# Patient Record
Sex: FEMALE | Race: BLACK OR AFRICAN AMERICAN | Employment: UNEMPLOYED | URBAN - METROPOLITAN AREA
[De-identification: names, ages, dates, MRNs, and addresses within clinical notes are randomized per-mention and may not be internally consistent; named-entity substitution may affect disease eponyms.]

---

## 2024-07-10 ENCOUNTER — APPOINTMENT (OUTPATIENT)
Dept: CT IMAGING | Age: 39
DRG: 074 | End: 2024-07-10
Payer: MEDICAID

## 2024-07-10 ENCOUNTER — HOSPITAL ENCOUNTER (INPATIENT)
Age: 39
LOS: 4 days | Discharge: HOME | DRG: 074 | End: 2024-07-14
Attending: EMERGENCY MEDICINE | Admitting: FAMILY MEDICINE
Payer: MEDICAID

## 2024-07-10 DIAGNOSIS — K31.84 DIABETIC GASTROPARESIS (HCC): Primary | ICD-10-CM

## 2024-07-10 DIAGNOSIS — R11.2 NAUSEA AND VOMITING, UNSPECIFIED VOMITING TYPE: ICD-10-CM

## 2024-07-10 DIAGNOSIS — R10.84 GENERALIZED ABDOMINAL PAIN: ICD-10-CM

## 2024-07-10 DIAGNOSIS — E11.43 DIABETIC GASTROPARESIS (HCC): Primary | ICD-10-CM

## 2024-07-10 PROBLEM — F12.10 MARIJUANA ABUSE: Status: ACTIVE | Noted: 2024-07-10

## 2024-07-10 PROBLEM — I10 HYPERTENSION: Status: ACTIVE | Noted: 2024-07-10

## 2024-07-10 PROBLEM — K59.00 CONSTIPATION: Status: ACTIVE | Noted: 2024-07-10

## 2024-07-10 LAB
ALBUMIN SERPL-MCNC: 3.9 G/DL (ref 3.5–5.2)
ALP SERPL-CCNC: 123 U/L (ref 35–104)
ALT SERPL-CCNC: 15 U/L (ref 5–33)
AMPHET UR QL SCN: NEGATIVE
AMYLASE SERPL-CCNC: 79 U/L (ref 28–100)
ANION GAP SERPL CALCULATED.3IONS-SCNC: 17 MMOL/L (ref 9–17)
AST SERPL-CCNC: 23 U/L
B-OH-BUTYR SERPL-MCNC: 0.32 MMOL/L (ref 0.02–0.27)
BACTERIA URNS QL MICRO: ABNORMAL
BARBITURATES UR QL SCN: NEGATIVE
BASOPHILS # BLD: 0 K/UL (ref 0–0.2)
BASOPHILS NFR BLD: 0 % (ref 0–2)
BENZODIAZ UR QL: NEGATIVE
BILIRUB DIRECT SERPL-MCNC: 0.1 MG/DL
BILIRUB INDIRECT SERPL-MCNC: 0.2 MG/DL (ref 0–1)
BILIRUB SERPL-MCNC: 0.3 MG/DL (ref 0.3–1.2)
BILIRUB UR QL STRIP: NEGATIVE
BUN SERPL-MCNC: 9 MG/DL (ref 6–20)
BUN/CREAT SERPL: 13 (ref 9–20)
CALCIUM SERPL-MCNC: 9 MG/DL (ref 8.6–10.4)
CANNABINOIDS UR QL SCN: POSITIVE
CHLORIDE SERPL-SCNC: 103 MMOL/L (ref 98–107)
CLARITY UR: ABNORMAL
CO2 SERPL-SCNC: 18 MMOL/L (ref 20–31)
COCAINE UR QL SCN: NEGATIVE
COLOR UR: YELLOW
CREAT SERPL-MCNC: 0.7 MG/DL (ref 0.5–0.9)
EOSINOPHIL # BLD: 0 K/UL (ref 0–0.44)
EOSINOPHILS RELATIVE PERCENT: 0 % (ref 1–4)
EPI CELLS #/AREA URNS HPF: ABNORMAL /HPF (ref 0–5)
ERYTHROCYTE [DISTWIDTH] IN BLOOD BY AUTOMATED COUNT: 21.6 % (ref 11.8–14.4)
FENTANYL UR QL: NEGATIVE
GFR, ESTIMATED: >90 ML/MIN/1.73M2
GLUCOSE BLD-MCNC: 298 MG/DL (ref 65–105)
GLUCOSE BLD-MCNC: 328 MG/DL (ref 65–105)
GLUCOSE SERPL-MCNC: 320 MG/DL (ref 70–99)
GLUCOSE UR STRIP-MCNC: ABNORMAL MG/DL
HCG SERPL QL: NEGATIVE
HCO3 VENOUS: 23.1 MMOL/L (ref 22–29)
HCT VFR BLD AUTO: 31.8 % (ref 36.3–47.1)
HGB BLD-MCNC: 8.5 G/DL (ref 11.9–15.1)
HGB UR QL STRIP.AUTO: ABNORMAL
IMM GRANULOCYTES # BLD AUTO: 0 K/UL (ref 0–0.3)
IMM GRANULOCYTES NFR BLD: 0 %
KETONES UR STRIP-MCNC: ABNORMAL MG/DL
LACTATE BLDV-SCNC: 3.2 MMOL/L (ref 0.5–2.2)
LEUKOCYTE ESTERASE UR QL STRIP: NEGATIVE
LIPASE SERPL-CCNC: 13 U/L (ref 13–60)
LYMPHOCYTES NFR BLD: 2.57 K/UL (ref 1.1–3.7)
LYMPHOCYTES RELATIVE PERCENT: 19 % (ref 24–43)
MAGNESIUM SERPL-MCNC: 2 MG/DL (ref 1.6–2.6)
MCH RBC QN AUTO: 16.2 PG (ref 25.2–33.5)
MCHC RBC AUTO-ENTMCNC: 26.7 G/DL (ref 28.4–34.8)
MCV RBC AUTO: 60.7 FL (ref 82.6–102.9)
METHADONE UR QL: NEGATIVE
MONOCYTES NFR BLD: 0.54 K/UL (ref 0.1–1.2)
MONOCYTES NFR BLD: 4 % (ref 3–12)
MORPHOLOGY: ABNORMAL
NEGATIVE BASE EXCESS, VEN: 1.7 MMOL/L (ref 0–2)
NEUTROPHILS NFR BLD: 77 % (ref 36–65)
NEUTS SEG NFR BLD: 10.39 K/UL (ref 1.5–8.1)
NITRITE UR QL STRIP: NEGATIVE
NRBC BLD-RTO: 0 PER 100 WBC
O2 SAT, VEN: 99.5 % (ref 60–85)
OPIATES UR QL SCN: POSITIVE
OXYCODONE UR QL SCN: NEGATIVE
PCO2 VENOUS: 38.3 MM HG (ref 41–51)
PCP UR QL SCN: NEGATIVE
PH UR STRIP: 7 [PH] (ref 5–8)
PH VENOUS: 7.39 (ref 7.32–7.43)
PLATELET # BLD AUTO: 333 K/UL (ref 138–453)
PMV BLD AUTO: 9.6 FL (ref 8.1–13.5)
PO2 VENOUS: 164 MM HG (ref 30–50)
POTASSIUM SERPL-SCNC: 3.9 MMOL/L (ref 3.7–5.3)
PROT SERPL-MCNC: 7.8 G/DL (ref 6.4–8.3)
PROT UR STRIP-MCNC: NEGATIVE MG/DL
RBC # BLD AUTO: 5.24 M/UL (ref 3.95–5.11)
RBC #/AREA URNS HPF: ABNORMAL /HPF (ref 0–2)
SODIUM SERPL-SCNC: 138 MMOL/L (ref 135–144)
SP GR UR STRIP: 1.01 (ref 1–1.03)
TEST INFORMATION: ABNORMAL
UROBILINOGEN UR STRIP-ACNC: NORMAL EU/DL (ref 0–1)
WBC #/AREA URNS HPF: ABNORMAL /HPF (ref 0–5)
WBC OTHER # BLD: 13.5 K/UL (ref 3.5–11.3)

## 2024-07-10 PROCEDURE — 83735 ASSAY OF MAGNESIUM: CPT

## 2024-07-10 PROCEDURE — 6360000002 HC RX W HCPCS

## 2024-07-10 PROCEDURE — 84703 CHORIONIC GONADOTROPIN ASSAY: CPT

## 2024-07-10 PROCEDURE — 85025 COMPLETE CBC W/AUTO DIFF WBC: CPT

## 2024-07-10 PROCEDURE — 99222 1ST HOSP IP/OBS MODERATE 55: CPT

## 2024-07-10 PROCEDURE — 2580000003 HC RX 258: Performed by: NURSE PRACTITIONER

## 2024-07-10 PROCEDURE — 6360000004 HC RX CONTRAST MEDICATION: Performed by: NURSE PRACTITIONER

## 2024-07-10 PROCEDURE — 6370000000 HC RX 637 (ALT 250 FOR IP)

## 2024-07-10 PROCEDURE — 82947 ASSAY GLUCOSE BLOOD QUANT: CPT

## 2024-07-10 PROCEDURE — 96375 TX/PRO/DX INJ NEW DRUG ADDON: CPT

## 2024-07-10 PROCEDURE — 82010 KETONE BODYS QUAN: CPT

## 2024-07-10 PROCEDURE — 80307 DRUG TEST PRSMV CHEM ANLYZR: CPT

## 2024-07-10 PROCEDURE — 2580000003 HC RX 258: Performed by: EMERGENCY MEDICINE

## 2024-07-10 PROCEDURE — 83605 ASSAY OF LACTIC ACID: CPT

## 2024-07-10 PROCEDURE — 82150 ASSAY OF AMYLASE: CPT

## 2024-07-10 PROCEDURE — 6360000002 HC RX W HCPCS: Performed by: NURSE PRACTITIONER

## 2024-07-10 PROCEDURE — 99285 EMERGENCY DEPT VISIT HI MDM: CPT

## 2024-07-10 PROCEDURE — 80076 HEPATIC FUNCTION PANEL: CPT

## 2024-07-10 PROCEDURE — 2060000000 HC ICU INTERMEDIATE R&B

## 2024-07-10 PROCEDURE — 82803 BLOOD GASES ANY COMBINATION: CPT

## 2024-07-10 PROCEDURE — 83690 ASSAY OF LIPASE: CPT

## 2024-07-10 PROCEDURE — 80048 BASIC METABOLIC PNL TOTAL CA: CPT

## 2024-07-10 PROCEDURE — 6360000002 HC RX W HCPCS: Performed by: EMERGENCY MEDICINE

## 2024-07-10 PROCEDURE — 81001 URINALYSIS AUTO W/SCOPE: CPT

## 2024-07-10 PROCEDURE — 96374 THER/PROPH/DIAG INJ IV PUSH: CPT

## 2024-07-10 PROCEDURE — 74177 CT ABD & PELVIS W/CONTRAST: CPT

## 2024-07-10 RX ORDER — MORPHINE SULFATE 2 MG/ML
2 INJECTION, SOLUTION INTRAMUSCULAR; INTRAVENOUS EVERY 4 HOURS PRN
Status: DISCONTINUED | OUTPATIENT
Start: 2024-07-10 | End: 2024-07-11

## 2024-07-10 RX ORDER — INSULIN LISPRO 100 [IU]/ML
0-4 INJECTION, SOLUTION INTRAVENOUS; SUBCUTANEOUS
Status: DISCONTINUED | OUTPATIENT
Start: 2024-07-11 | End: 2024-07-14 | Stop reason: HOSPADM

## 2024-07-10 RX ORDER — INSULIN LISPRO 100 [IU]/ML
3 INJECTION, SOLUTION INTRAVENOUS; SUBCUTANEOUS ONCE
Status: COMPLETED | OUTPATIENT
Start: 2024-07-11 | End: 2024-07-10

## 2024-07-10 RX ORDER — METOCLOPRAMIDE HYDROCHLORIDE 5 MG/ML
10 INJECTION INTRAMUSCULAR; INTRAVENOUS EVERY 4 HOURS PRN
Status: DISCONTINUED | OUTPATIENT
Start: 2024-07-10 | End: 2024-07-11

## 2024-07-10 RX ORDER — SODIUM CHLORIDE 0.9 % (FLUSH) 0.9 %
10 SYRINGE (ML) INJECTION PRN
Status: DISCONTINUED | OUTPATIENT
Start: 2024-07-10 | End: 2024-07-14 | Stop reason: HOSPADM

## 2024-07-10 RX ORDER — ACETAMINOPHEN 325 MG/1
650 TABLET ORAL EVERY 6 HOURS PRN
Status: DISCONTINUED | OUTPATIENT
Start: 2024-07-10 | End: 2024-07-14 | Stop reason: HOSPADM

## 2024-07-10 RX ORDER — FENTANYL CITRATE 0.05 MG/ML
25 INJECTION, SOLUTION INTRAMUSCULAR; INTRAVENOUS ONCE
Status: COMPLETED | OUTPATIENT
Start: 2024-07-10 | End: 2024-07-10

## 2024-07-10 RX ORDER — INSULIN LISPRO 100 [IU]/ML
0-4 INJECTION, SOLUTION INTRAVENOUS; SUBCUTANEOUS NIGHTLY
Status: DISCONTINUED | OUTPATIENT
Start: 2024-07-10 | End: 2024-07-14 | Stop reason: HOSPADM

## 2024-07-10 RX ORDER — POTASSIUM CHLORIDE 7.45 MG/ML
10 INJECTION INTRAVENOUS PRN
Status: DISCONTINUED | OUTPATIENT
Start: 2024-07-10 | End: 2024-07-14 | Stop reason: HOSPADM

## 2024-07-10 RX ORDER — SODIUM CHLORIDE 0.9 % (FLUSH) 0.9 %
5-40 SYRINGE (ML) INJECTION EVERY 12 HOURS SCHEDULED
Status: DISCONTINUED | OUTPATIENT
Start: 2024-07-10 | End: 2024-07-14 | Stop reason: HOSPADM

## 2024-07-10 RX ORDER — ONDANSETRON 4 MG/1
4 TABLET, ORALLY DISINTEGRATING ORAL EVERY 8 HOURS PRN
Status: DISCONTINUED | OUTPATIENT
Start: 2024-07-10 | End: 2024-07-12

## 2024-07-10 RX ORDER — MAGNESIUM SULFATE 1 G/100ML
1000 INJECTION INTRAVENOUS PRN
Status: DISCONTINUED | OUTPATIENT
Start: 2024-07-10 | End: 2024-07-14 | Stop reason: HOSPADM

## 2024-07-10 RX ORDER — ONDANSETRON 2 MG/ML
4 INJECTION INTRAMUSCULAR; INTRAVENOUS EVERY 6 HOURS PRN
Status: DISCONTINUED | OUTPATIENT
Start: 2024-07-10 | End: 2024-07-12

## 2024-07-10 RX ORDER — 0.9 % SODIUM CHLORIDE 0.9 %
1000 INTRAVENOUS SOLUTION INTRAVENOUS ONCE
Status: COMPLETED | OUTPATIENT
Start: 2024-07-10 | End: 2024-07-10

## 2024-07-10 RX ORDER — HYDRALAZINE HYDROCHLORIDE 20 MG/ML
10 INJECTION INTRAMUSCULAR; INTRAVENOUS EVERY 6 HOURS PRN
Status: DISCONTINUED | OUTPATIENT
Start: 2024-07-10 | End: 2024-07-14 | Stop reason: HOSPADM

## 2024-07-10 RX ORDER — ENOXAPARIN SODIUM 100 MG/ML
40 INJECTION SUBCUTANEOUS DAILY
Status: DISCONTINUED | OUTPATIENT
Start: 2024-07-11 | End: 2024-07-14 | Stop reason: HOSPADM

## 2024-07-10 RX ORDER — POLYETHYLENE GLYCOL 3350 17 G/17G
17 POWDER, FOR SOLUTION ORAL DAILY PRN
Status: DISCONTINUED | OUTPATIENT
Start: 2024-07-10 | End: 2024-07-14 | Stop reason: HOSPADM

## 2024-07-10 RX ORDER — ACETAMINOPHEN 650 MG/1
650 SUPPOSITORY RECTAL EVERY 6 HOURS PRN
Status: DISCONTINUED | OUTPATIENT
Start: 2024-07-10 | End: 2024-07-14 | Stop reason: HOSPADM

## 2024-07-10 RX ORDER — CAPSAICIN 0.75 MG/G
CREAM TOPICAL EVERY 6 HOURS
Status: DISCONTINUED | OUTPATIENT
Start: 2024-07-10 | End: 2024-07-14 | Stop reason: HOSPADM

## 2024-07-10 RX ORDER — HALOPERIDOL 5 MG/ML
2 INJECTION INTRAMUSCULAR ONCE
Status: COMPLETED | OUTPATIENT
Start: 2024-07-10 | End: 2024-07-10

## 2024-07-10 RX ORDER — METOCLOPRAMIDE HYDROCHLORIDE 5 MG/ML
10 INJECTION INTRAMUSCULAR; INTRAVENOUS ONCE
Status: COMPLETED | OUTPATIENT
Start: 2024-07-10 | End: 2024-07-10

## 2024-07-10 RX ORDER — SODIUM CHLORIDE, SODIUM LACTATE, POTASSIUM CHLORIDE, CALCIUM CHLORIDE 600; 310; 30; 20 MG/100ML; MG/100ML; MG/100ML; MG/100ML
INJECTION, SOLUTION INTRAVENOUS CONTINUOUS
Status: DISCONTINUED | OUTPATIENT
Start: 2024-07-10 | End: 2024-07-14 | Stop reason: HOSPADM

## 2024-07-10 RX ORDER — MORPHINE SULFATE 4 MG/ML
4 INJECTION, SOLUTION INTRAMUSCULAR; INTRAVENOUS ONCE
Status: COMPLETED | OUTPATIENT
Start: 2024-07-10 | End: 2024-07-10

## 2024-07-10 RX ORDER — ONDANSETRON 2 MG/ML
4 INJECTION INTRAMUSCULAR; INTRAVENOUS ONCE
Status: DISCONTINUED | OUTPATIENT
Start: 2024-07-10 | End: 2024-07-10

## 2024-07-10 RX ORDER — DEXTROSE MONOHYDRATE 100 MG/ML
INJECTION, SOLUTION INTRAVENOUS CONTINUOUS PRN
Status: DISCONTINUED | OUTPATIENT
Start: 2024-07-10 | End: 2024-07-14 | Stop reason: HOSPADM

## 2024-07-10 RX ORDER — POTASSIUM CHLORIDE 20 MEQ/1
40 TABLET, EXTENDED RELEASE ORAL PRN
Status: DISCONTINUED | OUTPATIENT
Start: 2024-07-10 | End: 2024-07-14 | Stop reason: HOSPADM

## 2024-07-10 RX ORDER — 0.9 % SODIUM CHLORIDE 0.9 %
100 INTRAVENOUS SOLUTION INTRAVENOUS ONCE
Status: DISCONTINUED | OUTPATIENT
Start: 2024-07-10 | End: 2024-07-14 | Stop reason: HOSPADM

## 2024-07-10 RX ORDER — SODIUM CHLORIDE 9 MG/ML
INJECTION, SOLUTION INTRAVENOUS PRN
Status: DISCONTINUED | OUTPATIENT
Start: 2024-07-10 | End: 2024-07-14 | Stop reason: HOSPADM

## 2024-07-10 RX ORDER — ONDANSETRON 2 MG/ML
4 INJECTION INTRAMUSCULAR; INTRAVENOUS ONCE
Status: COMPLETED | OUTPATIENT
Start: 2024-07-10 | End: 2024-07-10

## 2024-07-10 RX ORDER — HALOPERIDOL 0.5 MG/1
0.5 TABLET ORAL EVERY 6 HOURS PRN
Status: DISCONTINUED | OUTPATIENT
Start: 2024-07-10 | End: 2024-07-11

## 2024-07-10 RX ADMIN — IOPAMIDOL 75 ML: 755 INJECTION, SOLUTION INTRAVENOUS at 16:58

## 2024-07-10 RX ADMIN — Medication 100 ML: at 16:58

## 2024-07-10 RX ADMIN — SODIUM CHLORIDE, POTASSIUM CHLORIDE, SODIUM LACTATE AND CALCIUM CHLORIDE: 600; 310; 30; 20 INJECTION, SOLUTION INTRAVENOUS at 19:38

## 2024-07-10 RX ADMIN — HALOPERIDOL LACTATE 2 MG: 5 INJECTION, SOLUTION INTRAMUSCULAR at 19:08

## 2024-07-10 RX ADMIN — FENTANYL CITRATE 25 MCG: 0.05 INJECTION, SOLUTION INTRAMUSCULAR; INTRAVENOUS at 19:07

## 2024-07-10 RX ADMIN — METOCLOPRAMIDE HYDROCHLORIDE 10 MG: 5 INJECTION INTRAMUSCULAR; INTRAVENOUS at 16:24

## 2024-07-10 RX ADMIN — PANTOPRAZOLE SODIUM 40 MG: 40 INJECTION, POWDER, FOR SOLUTION INTRAVENOUS at 16:24

## 2024-07-10 RX ADMIN — ONDANSETRON 4 MG: 2 INJECTION INTRAMUSCULAR; INTRAVENOUS at 23:02

## 2024-07-10 RX ADMIN — CAPSAICIN: 0.75 CREAM TOPICAL at 23:43

## 2024-07-10 RX ADMIN — SODIUM CHLORIDE 1000 ML: 9 INJECTION, SOLUTION INTRAVENOUS at 16:23

## 2024-07-10 RX ADMIN — METOCLOPRAMIDE HYDROCHLORIDE 10 MG: 5 INJECTION INTRAMUSCULAR; INTRAVENOUS at 21:38

## 2024-07-10 RX ADMIN — MORPHINE SULFATE 4 MG: 4 INJECTION, SOLUTION INTRAMUSCULAR; INTRAVENOUS at 16:24

## 2024-07-10 RX ADMIN — SODIUM CHLORIDE, PRESERVATIVE FREE 10 ML: 5 INJECTION INTRAVENOUS at 16:58

## 2024-07-10 RX ADMIN — ONDANSETRON 4 MG: 2 INJECTION INTRAMUSCULAR; INTRAVENOUS at 19:34

## 2024-07-10 RX ADMIN — MORPHINE SULFATE 2 MG: 2 INJECTION, SOLUTION INTRAMUSCULAR; INTRAVENOUS at 23:19

## 2024-07-10 RX ADMIN — INSULIN LISPRO 3 UNITS: 100 INJECTION, SOLUTION INTRAVENOUS; SUBCUTANEOUS at 23:42

## 2024-07-10 ASSESSMENT — PAIN SCALES - GENERAL
PAINLEVEL_OUTOF10: 0
PAINLEVEL_OUTOF10: 9
PAINLEVEL_OUTOF10: 10
PAINLEVEL_OUTOF10: 9
PAINLEVEL_OUTOF10: 8
PAINLEVEL_OUTOF10: 2
PAINLEVEL_OUTOF10: 9

## 2024-07-10 ASSESSMENT — PAIN - FUNCTIONAL ASSESSMENT
PAIN_FUNCTIONAL_ASSESSMENT: PREVENTS OR INTERFERES SOME ACTIVE ACTIVITIES AND ADLS
PAIN_FUNCTIONAL_ASSESSMENT: NONE - DENIES PAIN
PAIN_FUNCTIONAL_ASSESSMENT: PREVENTS OR INTERFERES SOME ACTIVE ACTIVITIES AND ADLS

## 2024-07-10 ASSESSMENT — PAIN DESCRIPTION - LOCATION
LOCATION: ABDOMEN

## 2024-07-10 ASSESSMENT — LIFESTYLE VARIABLES
HOW OFTEN DO YOU HAVE A DRINK CONTAINING ALCOHOL: NEVER
HOW MANY STANDARD DRINKS CONTAINING ALCOHOL DO YOU HAVE ON A TYPICAL DAY: PATIENT DOES NOT DRINK

## 2024-07-10 ASSESSMENT — PAIN DESCRIPTION - DESCRIPTORS
DESCRIPTORS: SHARP
DESCRIPTORS: BURNING
DESCRIPTORS: BURNING;ACHING;TENDER

## 2024-07-10 ASSESSMENT — PAIN DESCRIPTION - PAIN TYPE: TYPE: ACUTE PAIN

## 2024-07-10 ASSESSMENT — PAIN DESCRIPTION - ORIENTATION
ORIENTATION: MID
ORIENTATION: MID
ORIENTATION: UPPER

## 2024-07-10 NOTE — ED NOTES
Pt present c/o abdominal pain + hemapoiesis x 2 days. Denies diarrhea. PT states she was hospitalized 2 years ago for gastroparesis for 9 months / pregnant at the time. Pt lives in [place] here visiting family.

## 2024-07-11 PROBLEM — K56.41 FECAL IMPACTION IN RECTUM (HCC): Status: ACTIVE | Noted: 2024-07-11

## 2024-07-11 LAB
ANION GAP SERPL CALCULATED.3IONS-SCNC: 16 MMOL/L (ref 9–17)
BASOPHILS # BLD: 0 K/UL (ref 0–0.2)
BASOPHILS NFR BLD: 0 % (ref 0–2)
BUN SERPL-MCNC: 6 MG/DL (ref 6–20)
BUN/CREAT SERPL: 12 (ref 9–20)
CALCIUM SERPL-MCNC: 8.6 MG/DL (ref 8.6–10.4)
CHLORIDE SERPL-SCNC: 101 MMOL/L (ref 98–107)
CO2 SERPL-SCNC: 20 MMOL/L (ref 20–31)
CREAT SERPL-MCNC: 0.5 MG/DL (ref 0.5–0.9)
EOSINOPHIL # BLD: 0 K/UL (ref 0–0.44)
EOSINOPHILS RELATIVE PERCENT: 0 % (ref 1–4)
ERYTHROCYTE [DISTWIDTH] IN BLOOD BY AUTOMATED COUNT: 21.3 % (ref 11.8–14.4)
EST. AVERAGE GLUCOSE BLD GHB EST-MCNC: 192 MG/DL
GFR, ESTIMATED: >90 ML/MIN/1.73M2
GLUCOSE BLD-MCNC: 256 MG/DL (ref 65–105)
GLUCOSE BLD-MCNC: 266 MG/DL (ref 65–105)
GLUCOSE BLD-MCNC: 276 MG/DL (ref 65–105)
GLUCOSE BLD-MCNC: 288 MG/DL (ref 65–105)
GLUCOSE BLD-MCNC: 298 MG/DL (ref 65–105)
GLUCOSE SERPL-MCNC: 297 MG/DL (ref 70–99)
HBA1C MFR BLD: 8.3 % (ref 4–6)
HCT VFR BLD AUTO: 29.4 % (ref 36.3–47.1)
HGB BLD-MCNC: 8 G/DL (ref 11.9–15.1)
IMM GRANULOCYTES # BLD AUTO: 0.16 K/UL (ref 0–0.3)
IMM GRANULOCYTES NFR BLD: 1 %
LYMPHOCYTES NFR BLD: 1.3 K/UL (ref 1.1–3.7)
LYMPHOCYTES RELATIVE PERCENT: 8 % (ref 24–43)
MCH RBC QN AUTO: 16.4 PG (ref 25.2–33.5)
MCHC RBC AUTO-ENTMCNC: 27.2 G/DL (ref 28.4–34.8)
MCV RBC AUTO: 60.1 FL (ref 82.6–102.9)
MONOCYTES NFR BLD: 0.49 K/UL (ref 0.1–1.2)
MONOCYTES NFR BLD: 3 % (ref 3–12)
MORPHOLOGY: ABNORMAL
NEUTROPHILS NFR BLD: 88 % (ref 36–65)
NEUTS SEG NFR BLD: 14.25 K/UL (ref 1.5–8.1)
NRBC BLD-RTO: 0 PER 100 WBC
PLATELET # BLD AUTO: 290 K/UL (ref 138–453)
PMV BLD AUTO: 9.4 FL (ref 8.1–13.5)
POTASSIUM SERPL-SCNC: 4 MMOL/L (ref 3.7–5.3)
RBC # BLD AUTO: 4.89 M/UL (ref 3.95–5.11)
SODIUM SERPL-SCNC: 137 MMOL/L (ref 135–144)
WBC OTHER # BLD: 16.2 K/UL (ref 3.5–11.3)

## 2024-07-11 PROCEDURE — 6360000002 HC RX W HCPCS

## 2024-07-11 PROCEDURE — 6370000000 HC RX 637 (ALT 250 FOR IP)

## 2024-07-11 PROCEDURE — 2580000003 HC RX 258

## 2024-07-11 PROCEDURE — 80048 BASIC METABOLIC PNL TOTAL CA: CPT

## 2024-07-11 PROCEDURE — 2060000000 HC ICU INTERMEDIATE R&B

## 2024-07-11 PROCEDURE — 51701 INSERT BLADDER CATHETER: CPT

## 2024-07-11 PROCEDURE — 85025 COMPLETE CBC W/AUTO DIFF WBC: CPT

## 2024-07-11 PROCEDURE — 6360000002 HC RX W HCPCS: Performed by: NURSE PRACTITIONER

## 2024-07-11 PROCEDURE — 6370000000 HC RX 637 (ALT 250 FOR IP): Performed by: FAMILY MEDICINE

## 2024-07-11 PROCEDURE — 99232 SBSQ HOSP IP/OBS MODERATE 35: CPT | Performed by: FAMILY MEDICINE

## 2024-07-11 PROCEDURE — 05HB33Z INSERTION OF INFUSION DEVICE INTO RIGHT BASILIC VEIN, PERCUTANEOUS APPROACH: ICD-10-PCS | Performed by: FAMILY MEDICINE

## 2024-07-11 PROCEDURE — 51798 US URINE CAPACITY MEASURE: CPT

## 2024-07-11 PROCEDURE — 6360000002 HC RX W HCPCS: Performed by: FAMILY MEDICINE

## 2024-07-11 PROCEDURE — 36415 COLL VENOUS BLD VENIPUNCTURE: CPT

## 2024-07-11 PROCEDURE — 2580000003 HC RX 258: Performed by: FAMILY MEDICINE

## 2024-07-11 PROCEDURE — 83036 HEMOGLOBIN GLYCOSYLATED A1C: CPT

## 2024-07-11 PROCEDURE — 82947 ASSAY GLUCOSE BLOOD QUANT: CPT

## 2024-07-11 RX ORDER — PAROXETINE 30 MG/1
30 TABLET, FILM COATED ORAL EVERY MORNING
COMMUNITY

## 2024-07-11 RX ORDER — FENTANYL CITRATE 0.05 MG/ML
25 INJECTION, SOLUTION INTRAMUSCULAR; INTRAVENOUS EVERY 4 HOURS PRN
Status: DISCONTINUED | OUTPATIENT
Start: 2024-07-11 | End: 2024-07-13

## 2024-07-11 RX ORDER — INSULIN ASPART 100 [IU]/ML
40 INJECTION, SOLUTION INTRAVENOUS; SUBCUTANEOUS
COMMUNITY

## 2024-07-11 RX ORDER — INSULIN GLARGINE 300 U/ML
106 INJECTION, SOLUTION SUBCUTANEOUS DAILY
COMMUNITY

## 2024-07-11 RX ORDER — DOCUSATE SODIUM 100 MG/1
100 CAPSULE, LIQUID FILLED ORAL 2 TIMES DAILY
Status: DISCONTINUED | OUTPATIENT
Start: 2024-07-11 | End: 2024-07-14 | Stop reason: HOSPADM

## 2024-07-11 RX ORDER — BISACODYL 10 MG
10 SUPPOSITORY, RECTAL RECTAL ONCE
Status: COMPLETED | OUTPATIENT
Start: 2024-07-11 | End: 2024-07-11

## 2024-07-11 RX ORDER — METOPROLOL SUCCINATE 50 MG/1
50 TABLET, EXTENDED RELEASE ORAL DAILY
COMMUNITY

## 2024-07-11 RX ORDER — LISINOPRIL 20 MG/1
20 TABLET ORAL DAILY
Status: DISCONTINUED | OUTPATIENT
Start: 2024-07-11 | End: 2024-07-13

## 2024-07-11 RX ORDER — PROCHLORPERAZINE EDISYLATE 5 MG/ML
10 INJECTION INTRAMUSCULAR; INTRAVENOUS ONCE
Status: COMPLETED | OUTPATIENT
Start: 2024-07-11 | End: 2024-07-11

## 2024-07-11 RX ORDER — METOCLOPRAMIDE HYDROCHLORIDE 5 MG/ML
10 INJECTION INTRAMUSCULAR; INTRAVENOUS EVERY 6 HOURS
Status: DISCONTINUED | OUTPATIENT
Start: 2024-07-11 | End: 2024-07-14 | Stop reason: HOSPADM

## 2024-07-11 RX ORDER — ACYCLOVIR 400 MG/1
TABLET ORAL
COMMUNITY

## 2024-07-11 RX ORDER — FERROUS SULFATE 325(65) MG
325 TABLET ORAL
Status: ON HOLD | COMMUNITY
End: 2024-07-11

## 2024-07-11 RX ORDER — LOSARTAN POTASSIUM 50 MG/1
50 TABLET ORAL DAILY
COMMUNITY

## 2024-07-11 RX ORDER — LORAZEPAM 0.5 MG/1
0.5 TABLET ORAL 2 TIMES DAILY PRN
COMMUNITY

## 2024-07-11 RX ORDER — DICYCLOMINE HYDROCHLORIDE 10 MG/ML
20 INJECTION INTRAMUSCULAR ONCE
Status: COMPLETED | OUTPATIENT
Start: 2024-07-11 | End: 2024-07-11

## 2024-07-11 RX ORDER — PROCHLORPERAZINE EDISYLATE 5 MG/ML
10 INJECTION INTRAMUSCULAR; INTRAVENOUS EVERY 6 HOURS PRN
Status: DISCONTINUED | OUTPATIENT
Start: 2024-07-11 | End: 2024-07-13

## 2024-07-11 RX ADMIN — METOCLOPRAMIDE HYDROCHLORIDE 10 MG: 5 INJECTION INTRAMUSCULAR; INTRAVENOUS at 11:16

## 2024-07-11 RX ADMIN — DICYCLOMINE HYDROCHLORIDE 20 MG: 10 INJECTION, SOLUTION INTRAMUSCULAR at 21:50

## 2024-07-11 RX ADMIN — HYDRALAZINE HYDROCHLORIDE 10 MG: 20 INJECTION INTRAMUSCULAR; INTRAVENOUS at 20:25

## 2024-07-11 RX ADMIN — FENTANYL CITRATE 25 MCG: 0.05 INJECTION, SOLUTION INTRAMUSCULAR; INTRAVENOUS at 11:14

## 2024-07-11 RX ADMIN — PROCHLORPERAZINE EDISYLATE 10 MG: 5 INJECTION INTRAMUSCULAR; INTRAVENOUS at 14:15

## 2024-07-11 RX ADMIN — PROCHLORPERAZINE EDISYLATE 10 MG: 5 INJECTION INTRAMUSCULAR; INTRAVENOUS at 08:09

## 2024-07-11 RX ADMIN — METOCLOPRAMIDE HYDROCHLORIDE 10 MG: 5 INJECTION INTRAMUSCULAR; INTRAVENOUS at 04:25

## 2024-07-11 RX ADMIN — SODIUM CHLORIDE, PRESERVATIVE FREE 10 ML: 5 INJECTION INTRAVENOUS at 11:15

## 2024-07-11 RX ADMIN — BISACODYL 10 MG: 10 SUPPOSITORY RECTAL at 08:09

## 2024-07-11 RX ADMIN — FENTANYL CITRATE 25 MCG: 0.05 INJECTION, SOLUTION INTRAMUSCULAR; INTRAVENOUS at 19:52

## 2024-07-11 RX ADMIN — PROCHLORPERAZINE EDISYLATE 10 MG: 5 INJECTION INTRAMUSCULAR; INTRAVENOUS at 05:03

## 2024-07-11 RX ADMIN — CAPSAICIN: 0.75 CREAM TOPICAL at 05:07

## 2024-07-11 RX ADMIN — INSULIN LISPRO 2 UNITS: 100 INJECTION, SOLUTION INTRAVENOUS; SUBCUTANEOUS at 17:44

## 2024-07-11 RX ADMIN — METOCLOPRAMIDE HYDROCHLORIDE 10 MG: 5 INJECTION INTRAMUSCULAR; INTRAVENOUS at 19:47

## 2024-07-11 RX ADMIN — ENOXAPARIN SODIUM 40 MG: 100 INJECTION SUBCUTANEOUS at 11:14

## 2024-07-11 RX ADMIN — SODIUM CHLORIDE, POTASSIUM CHLORIDE, SODIUM LACTATE AND CALCIUM CHLORIDE: 600; 310; 30; 20 INJECTION, SOLUTION INTRAVENOUS at 04:52

## 2024-07-11 RX ADMIN — LISINOPRIL 20 MG: 20 TABLET ORAL at 11:14

## 2024-07-11 RX ADMIN — MORPHINE SULFATE 2 MG: 2 INJECTION, SOLUTION INTRAMUSCULAR; INTRAVENOUS at 04:55

## 2024-07-11 RX ADMIN — Medication 240 ML: at 13:39

## 2024-07-11 RX ADMIN — SODIUM CHLORIDE, PRESERVATIVE FREE 10 ML: 5 INJECTION INTRAVENOUS at 20:25

## 2024-07-11 RX ADMIN — HYDRALAZINE HYDROCHLORIDE 10 MG: 20 INJECTION INTRAMUSCULAR; INTRAVENOUS at 04:22

## 2024-07-11 RX ADMIN — INSULIN LISPRO 2 UNITS: 100 INJECTION, SOLUTION INTRAVENOUS; SUBCUTANEOUS at 13:37

## 2024-07-11 RX ADMIN — SODIUM CHLORIDE, PRESERVATIVE FREE 40 MG: 5 INJECTION INTRAVENOUS at 12:36

## 2024-07-11 RX ADMIN — INSULIN LISPRO 2 UNITS: 100 INJECTION, SOLUTION INTRAVENOUS; SUBCUTANEOUS at 11:16

## 2024-07-11 RX ADMIN — SODIUM CHLORIDE, POTASSIUM CHLORIDE, SODIUM LACTATE AND CALCIUM CHLORIDE: 600; 310; 30; 20 INJECTION, SOLUTION INTRAVENOUS at 13:48

## 2024-07-11 ASSESSMENT — PAIN DESCRIPTION - DESCRIPTORS
DESCRIPTORS: SHARP
DESCRIPTORS: STABBING
DESCRIPTORS: STABBING

## 2024-07-11 ASSESSMENT — PAIN DESCRIPTION - LOCATION
LOCATION: ABDOMEN

## 2024-07-11 ASSESSMENT — PAIN - FUNCTIONAL ASSESSMENT
PAIN_FUNCTIONAL_ASSESSMENT: PREVENTS OR INTERFERES SOME ACTIVE ACTIVITIES AND ADLS

## 2024-07-11 ASSESSMENT — PAIN SCALES - GENERAL
PAINLEVEL_OUTOF10: 7
PAINLEVEL_OUTOF10: 7
PAINLEVEL_OUTOF10: 8
PAINLEVEL_OUTOF10: 8
PAINLEVEL_OUTOF10: 10
PAINLEVEL_OUTOF10: 7

## 2024-07-11 ASSESSMENT — PAIN DESCRIPTION - ORIENTATION
ORIENTATION: UPPER
ORIENTATION: MID

## 2024-07-11 NOTE — ED PROVIDER NOTES
CURRENT MEDICATIONS       Current Discharge Medication List        CONTINUE these medications which have NOT CHANGED    Details   !! Accu-Chek Multiclix Lancets MISC 100 strips by Does not apply route three times daily.  Qty: 100 each, Refills: 3    Associated Diagnoses: Diabetes (HCC)      albuterol (PROVENTIL HFA) 108 (90 BASE) MCG/ACT inhaler Inhale 2 puffs into the lungs every 6 hours as needed for Wheezing.  Qty: 1 Inhaler, Refills: 3    Associated Diagnoses: Asthma exacerbation, mild intermittent      Insulin Syringe-Needle U-100 (INSULIN SYRINGE .5CC/30GX1/2\") 30G X 1/2\" 0.5 ML MISC Use 2-3 times daily. Dx: 250.00  Qty: 100 Syringe, Refills: 6    Associated Diagnoses: Diabetes (HCC)      insulin aspart (NOVOLOG FLEXPEN) 100 UNIT/ML injection pen Inject 10 Units into the skin 3 times daily (before meals). Dispense with pen needles  Qty: 1 Pen, Refills: 3    Associated Diagnoses: Diabetes (HCC)      Insulin Pen Needle ([employer] PEN NEEDLES 29G) 29G X 12MM MISC 1 each by Does not apply route daily.  Qty: 100 each, Refills: 3    Associated Diagnoses: DM w/o complication type I (HCC)      ferrous sulfate (JOLENE-KENISHA) 325 (65 FE) MG tablet Take 1 tablet by mouth daily.  Qty: 30 tablet, Refills: 3    Associated Diagnoses: Iron deficiency anemia      !! [facility] Lancets MISC achs   Check blood sugars before meals.  Qty: 100 each, Refills: 3    Associated Diagnoses: Diabetes 1.5, managed as type 1 (HCC); Diabetic neuropathy (HCC)      insulin glargine (LANTUS SOLOSTAR) 100 UNIT/ML injection pen Inject 40 Units into the skin nightly.  Qty: 1 Pen, Refills: 3    Associated Diagnoses: Diabetes 1.5, managed as type 1 (HCC)       !! - Potential duplicate medications found. Please discuss with provider.        ALLERGIES     has No Known Allergies.  FAMILY HISTORY     has no family status information on file.      SOCIAL HISTORY       Social History     Tobacco Use    Smoking status: Never    Smokeless tobacco: Never   Vaping Use 
provider for further instruction.   If patient experienced a hypoglycemic event in last 24 hours, obtain blood glucose at 0200.     Standing Status:   Standing     Number of Occurrences:   96358    POCT Glucose     Standing Status:   Standing     Number of Occurrences:   16    POCT Glucose     Standing Status:   Standing     Number of Occurrences:   1    POC Glucose Fingerstick     Standing Status:   Standing     Number of Occurrences:   1    POCT Glucose     Standing Status:   Standing     Number of Occurrences:   37    POC Glucose Fingerstick     Standing Status:   Standing     Number of Occurrences:   1    POC Glucose Fingerstick     Standing Status:   Standing     Number of Occurrences:   1    POC Glucose Fingerstick     Standing Status:   Standing     Number of Occurrences:   1    POC Glucose Fingerstick     Standing Status:   Standing     Number of Occurrences:   1    Insert peripheral IV     Standing Status:   Standing     Number of Occurrences:   1    ADMIT TO INPATIENT     Standing Status:   Standing     Number of Occurrences:   1     Order Specific Question:   Telemetry/Cardiac Monitoring Required?     Answer:   Yes     Order Specific Question:   Discharge Plan:     Answer:   Home with Office Follow-up       Test considered, but not ordered: None    The patient was involved in his/her plan of care through shared decision making. The testing that was ordered was discussed with the patient. Any medications that may have been ordered were discussed with the patient.       I have reviewed the patient's previous medical records using the electronic health record that we have available.      Patient presents with abdominal pain, nausea and vomiting for the past several days.  Labs and imaging pending.  She was given IV fluids, Protonix Reglan and morphine initially in the ED.  Patient's workup in progress at the end my shift.  Patient signed out to ER physician, Dr. Gordon.        CONSULTS:  IP CONSULT TO

## 2024-07-11 NOTE — CARE COORDINATION
Case Management Assessment  Initial Evaluation    Date/Time of Evaluation: 7/11/2024 2:48 PM  Assessment Completed by: TRISTAN HALL RN    If patient is discharged prior to next notation, then this note serves as note for discharge by case management.    Patient Name: Pa Giraldo                   YOB: 1985  Diagnosis: Generalized abdominal pain [R10.84]  Diabetic gastroparesis (HCC) [E11.43, K31.84]  Nausea and vomiting, unspecified vomiting type [R11.2]                   Date / Time: 7/10/2024  3:49 PM    Patient Admission Status: Inpatient   Readmission Risk (Low < 19, Mod (19-27), High > 27): Readmission Risk Score: 11.8    Current PCP: No primary care provider on file.  PCP verified by CM? Yes    Chart Reviewed: Yes      History Provided by: Patient  Patient Orientation: Alert and Oriented    Patient Cognition: Alert    Hospitalization in the last 30 days (Readmission):  No    If yes, Readmission Assessment in  Navigator will be completed.    Advance Directives:      Code Status: Full Code   Patient's Primary Decision Maker is: Legal Next of Kin      Discharge Planning:    Patient lives with: Family Members Type of Home: House  Primary Care Giver: Self  Patient Support Systems include: Family Members   Current Financial resources: Financial Counseling, HELP, Other (Comment) ([facility] form [place]. needs to be dropped before [place] can )  Current community resources: None  Current services prior to admission: Durable Medical Equipment            Current DME: Glucometer (dex com)            Type of Home Care services:  None    ADLS  Prior functional level: Independent in ADLs/IADLs  Current functional level: Independent in ADLs/IADLs    PT AM-PAC:   /24  OT AM-PAC:   /24    Family can provide assistance at DC: Yes  Would you like Case Management to discuss the discharge plan with any other family members/significant others, and if so, who? No  Plans to Return to Present Housing: Yes  Other

## 2024-07-11 NOTE — H&P
Does not apply route daily. 10/30/14   Jona Degroot MD   ferrous sulfate (OJLENE-KENISHA) 325 (65 FE) MG tablet Take 1 tablet by mouth daily. 10/23/14   Ulises Mejia MD   [facility] Lanc[facility]   Check blood sugars before meals. 10/23/14   Ulises Mejia MD   insulin glargine (LANTUS SOLOSTAR) 100 UNIT/ML injection pen Inject 40 Units into the skin nightly. 10/23/14   Ulises Mejia MD        Allergies:     Patient has no known allergies.    Social History:     Tobacco:    reports that she has never smoked. She has never used smokeless tobacco.  Alcohol:      reports no history of alcohol use.  Drug Use:  reports no history of drug use.    Family History:     Family History   Problem Relation Age of Onset    Diabetes Paternal Grandfather     Diabetes Paternal Grandmother     Hypertension Paternal Grandmother     Diabetes Father     Hypertension Father     Hypertension Mother        Review of Systems:     Positive and Negative as described in HPI.    Review of Systems   Constitutional:  Negative for appetite change, chills, fatigue and fever.   Eyes:  Negative for visual disturbance.   Respiratory:  Negative for shortness of breath and wheezing.    Cardiovascular:  Negative for chest pain and leg swelling.   Gastrointestinal:  Positive for abdominal pain, nausea and vomiting. Negative for constipation.   Endocrine: Negative for polydipsia, polyphagia and polyuria.   Genitourinary:  Negative for decreased urine volume, difficulty urinating and flank pain.   Musculoskeletal:  Negative for arthralgias.   Skin:  Negative for rash.   Neurological:  Negative for tremors and weakness.   Psychiatric/Behavioral:  Negative for confusion, hallucinations and suicidal ideas.        Physical Exam:   BP (!) 169/70   Pulse 99   Temp 99.1 °F (37.3 °C) (Oral)   Resp 18   Ht 1.499 m (4' 11\")   Wt 95.7 kg (211 lb)   SpO2 98%   BMI 42.62 kg/m²   Temp (24hrs), Av.5 °F (36.9 °C), Min:98.1 °F (36.7 °C), Max:99.1 °F

## 2024-07-11 NOTE — CONSULTS
GASTROENTEROLOGY CONSULT       REASON FOR CONSULT:  diabetic gastroparesis    REQUESTING PHYSICIAN:  Batsheva Smith MD    HISTORY OF PRESENT ILLNESS:    The patient is a 39 y.o. female who presents with acute on intermittently chronic GI distress with abdominal pain, nausea and vomiting. She also has some streaks of blood overnight and RN did see some vomit that had dark red appearance to it as well.   Patient is a diabetic with poor glycemic control.   Labs in ED showed normal kidney function, liver function. Patient had hyperglycemia at 328 with beta-hydroxybutyrate 0.32, U tox was positive for cannabinoids and opiates. She was found to have leukocytosis 13.5 with hemoglobin 8.5. UA was negative for nitrate, leukoesterase. CT abdomen pelvis with IV contrast showed diverticulosis without acute diverticulitis, cholelithiasis without acute cholecystitis, stool impaction.     Patient moved to [place] a few days ago, had previously followed GI in [place]. Reports past EGD, states she does not feels she needs a repeat EGD as these symptoms are typical for her. She reports she takes Reglan at home, but is running low. Denies routinely taking PPI or H2RA's or PCAB. At home takes hot showers to help pain, uses marijuana.     Feels constipated, which is typical for her, she is asking for pain medication due to epigastric pain. She does have her gallbladder she does have gallstones. She is mildly tachycardic.     MEDICAL HISTORY:   Past Medical History:   Diagnosis Date    Diabetes mellitus (HCC)     dx age 17    Hypertension 07/10/2024    Menorrhagia     Obesity (BMI 30-39.9) 01/29/2015       SURGICAL HISTORY:  Past Surgical History:   Procedure Laterality Date    INTRAUTERINE DEVICE INSERTION  11/14/2014       MEDS:  Prior to Admission medications    Medication Sig Start Date End Date Taking? Authorizing Provider   Accu-Chek Multiclix Lancets MISC 100 strips by Does not apply route three times daily. 1/29/15

## 2024-07-11 NOTE — PROCEDURES
Midline placement note:   Dynamic Access RN    Prescribed IV Therapy = Access  Peripheral ultrasound assessment done. Plan for Right basillic vein insertion.   CVR measurement = 35 % (Linear CVR is preferred to be less than 45%).  Product type: Bard 4 fr midline  History/Labs/Allergies Reviewed  Placed By: Aaron Escalona RN - RN (Dynamic Access)  Time out Performed using Two Identifiers  Lot # [id number]  Expiration date = 06-  Trimmed at 11 cm  External catheter length 1 cm  Number of attempts 1  Special equipment used - Ultrasound and an MST insertion technique  Catheter securement = 3M securement device  Dressing applied= Tegaderm CHG  Lidocaine administered intradermally conc.1%, approx 1ml. (Lot# [id number] and Exp date= 09-)  Device should have blood return, if no blood return assess for infiltration and/or call VAT for consult.  RN aware midline placed and is immediately released for use. No cxr required due to placement of a midline.     Midline education:   [x  ] Post care line insertion was discussed with patient/Family or POA prior to procedure.  Risks, benefits, alternatives, and reason for procedure were discussed and teaching was reinforced. An educational handout on post insertion line care and maintenance was left at bedside with patient or in chart. Patient (Family or POA) acknowledged understanding of information relayed.      [  ] Post care of line insertion was not discussed with patient/family or POA due to pts medical status at time of procedure. Patient's family or POA not available to discuss line education. An educational handout on post insertion line care and maintenance was left at bedside or in chart.        Upload picture of vesselPROCEDURE NOTE  Date: 7/11/2024   Name: Pa Giraldo  YOB: 1985    Procedures

## 2024-07-12 LAB
ANION GAP SERPL CALCULATED.3IONS-SCNC: 13 MMOL/L (ref 9–17)
BASOPHILS # BLD: 0 K/UL (ref 0–0.2)
BASOPHILS NFR BLD: 0 %
BILIRUB UR QL STRIP: NEGATIVE
BUN SERPL-MCNC: 8 MG/DL (ref 6–20)
BUN/CREAT SERPL: 13 (ref 9–20)
CALCIUM SERPL-MCNC: 8.6 MG/DL (ref 8.6–10.4)
CHLORIDE SERPL-SCNC: 102 MMOL/L (ref 98–107)
CLARITY UR: ABNORMAL
CO2 SERPL-SCNC: 22 MMOL/L (ref 20–31)
COLOR UR: YELLOW
CREAT SERPL-MCNC: 0.6 MG/DL (ref 0.5–0.9)
EOSINOPHIL # BLD: 0 K/UL (ref 0–0.4)
EOSINOPHILS RELATIVE PERCENT: 0 % (ref 1–4)
EPI CELLS #/AREA URNS HPF: ABNORMAL /HPF (ref 0–5)
ERYTHROCYTE [DISTWIDTH] IN BLOOD BY AUTOMATED COUNT: 21.4 % (ref 11.8–14.4)
GFR, ESTIMATED: >90 ML/MIN/1.73M2
GLUCOSE BLD-MCNC: 223 MG/DL (ref 65–105)
GLUCOSE BLD-MCNC: 235 MG/DL (ref 65–105)
GLUCOSE BLD-MCNC: 253 MG/DL (ref 65–105)
GLUCOSE BLD-MCNC: 254 MG/DL (ref 65–105)
GLUCOSE BLD-MCNC: 257 MG/DL (ref 65–105)
GLUCOSE BLD-MCNC: 262 MG/DL (ref 65–105)
GLUCOSE SERPL-MCNC: 253 MG/DL (ref 70–99)
GLUCOSE UR STRIP-MCNC: ABNORMAL MG/DL
HCT VFR BLD AUTO: 29.9 % (ref 36.3–47.1)
HGB BLD-MCNC: 8 G/DL (ref 11.9–15.1)
HGB UR QL STRIP.AUTO: ABNORMAL
IMM GRANULOCYTES # BLD AUTO: 0.15 K/UL (ref 0–0.3)
IMM GRANULOCYTES NFR BLD: 1 %
IRON SATN MFR SERPL: 4 % (ref 20–55)
IRON SERPL-MCNC: 15 UG/DL (ref 37–145)
KETONES UR STRIP-MCNC: ABNORMAL MG/DL
LEUKOCYTE ESTERASE UR QL STRIP: ABNORMAL
LYMPHOCYTES NFR BLD: 1.98 K/UL (ref 1–4.8)
LYMPHOCYTES RELATIVE PERCENT: 13 % (ref 24–44)
MCH RBC QN AUTO: 16 PG (ref 25.2–33.5)
MCHC RBC AUTO-ENTMCNC: 26.8 G/DL (ref 28.4–34.8)
MCV RBC AUTO: 59.8 FL (ref 82.6–102.9)
MONOCYTES NFR BLD: 0.61 K/UL (ref 0.2–0.8)
MONOCYTES NFR BLD: 4 % (ref 1–7)
MORPHOLOGY: ABNORMAL
NEUTROPHILS NFR BLD: 82 % (ref 36–66)
NEUTS SEG NFR BLD: 12.46 K/UL (ref 1.8–7.7)
NITRITE UR QL STRIP: NEGATIVE
NRBC BLD-RTO: 0 PER 100 WBC
PH UR STRIP: 5.5 [PH] (ref 5–8)
PLATELET # BLD AUTO: 349 K/UL (ref 138–453)
PMV BLD AUTO: 9.2 FL (ref 8.1–13.5)
POTASSIUM SERPL-SCNC: 3.8 MMOL/L (ref 3.7–5.3)
PROT UR STRIP-MCNC: ABNORMAL MG/DL
RBC # BLD AUTO: 5 M/UL (ref 3.95–5.11)
RBC #/AREA URNS HPF: ABNORMAL /HPF (ref 0–2)
SODIUM SERPL-SCNC: 137 MMOL/L (ref 135–144)
SP GR UR STRIP: 1.03 (ref 1–1.03)
TIBC SERPL-MCNC: 387 UG/DL (ref 250–450)
UNSATURATED IRON BINDING CAPACITY: 372 UG/DL (ref 112–347)
UROBILINOGEN UR STRIP-ACNC: NORMAL EU/DL (ref 0–1)
WBC #/AREA URNS HPF: ABNORMAL /HPF (ref 0–5)
WBC OTHER # BLD: 15.2 K/UL (ref 3.5–11.3)
YEAST URNS QL MICRO: ABNORMAL

## 2024-07-12 PROCEDURE — 6360000002 HC RX W HCPCS: Performed by: NURSE PRACTITIONER

## 2024-07-12 PROCEDURE — 82947 ASSAY GLUCOSE BLOOD QUANT: CPT

## 2024-07-12 PROCEDURE — 85025 COMPLETE CBC W/AUTO DIFF WBC: CPT

## 2024-07-12 PROCEDURE — 81001 URINALYSIS AUTO W/SCOPE: CPT

## 2024-07-12 PROCEDURE — 2580000003 HC RX 258: Performed by: FAMILY MEDICINE

## 2024-07-12 PROCEDURE — 99232 SBSQ HOSP IP/OBS MODERATE 35: CPT | Performed by: FAMILY MEDICINE

## 2024-07-12 PROCEDURE — 80048 BASIC METABOLIC PNL TOTAL CA: CPT

## 2024-07-12 PROCEDURE — 2060000000 HC ICU INTERMEDIATE R&B

## 2024-07-12 PROCEDURE — 2500000003 HC RX 250 WO HCPCS: Performed by: NURSE PRACTITIONER

## 2024-07-12 PROCEDURE — 2580000003 HC RX 258: Performed by: NURSE PRACTITIONER

## 2024-07-12 PROCEDURE — 83540 ASSAY OF IRON: CPT

## 2024-07-12 PROCEDURE — 6360000002 HC RX W HCPCS: Performed by: FAMILY MEDICINE

## 2024-07-12 PROCEDURE — 6370000000 HC RX 637 (ALT 250 FOR IP)

## 2024-07-12 PROCEDURE — 6360000002 HC RX W HCPCS

## 2024-07-12 PROCEDURE — 83550 IRON BINDING TEST: CPT

## 2024-07-12 PROCEDURE — 2580000003 HC RX 258

## 2024-07-12 PROCEDURE — 36415 COLL VENOUS BLD VENIPUNCTURE: CPT

## 2024-07-12 RX ORDER — PAROXETINE HYDROCHLORIDE 20 MG/1
20 TABLET, FILM COATED ORAL EVERY MORNING
COMMUNITY

## 2024-07-12 RX ORDER — METOPROLOL TARTRATE 1 MG/ML
5 INJECTION, SOLUTION INTRAVENOUS ONCE
Status: COMPLETED | OUTPATIENT
Start: 2024-07-12 | End: 2024-07-12

## 2024-07-12 RX ORDER — SCOLOPAMINE TRANSDERMAL SYSTEM 1 MG/1
1 PATCH, EXTENDED RELEASE TRANSDERMAL
Status: DISCONTINUED | OUTPATIENT
Start: 2024-07-12 | End: 2024-07-14 | Stop reason: HOSPADM

## 2024-07-12 RX ORDER — INSULIN GLARGINE 100 [IU]/ML
88 INJECTION, SOLUTION SUBCUTANEOUS NIGHTLY
Status: ON HOLD | COMMUNITY
End: 2024-07-12

## 2024-07-12 RX ORDER — DIPHENHYDRAMINE HYDROCHLORIDE 50 MG/ML
25 INJECTION INTRAMUSCULAR; INTRAVENOUS ONCE
Status: COMPLETED | OUTPATIENT
Start: 2024-07-12 | End: 2024-07-12

## 2024-07-12 RX ADMIN — METOPROLOL TARTRATE 5 MG: 5 INJECTION INTRAVENOUS at 00:44

## 2024-07-12 RX ADMIN — PROCHLORPERAZINE EDISYLATE 10 MG: 5 INJECTION INTRAMUSCULAR; INTRAVENOUS at 10:06

## 2024-07-12 RX ADMIN — METOCLOPRAMIDE HYDROCHLORIDE 10 MG: 5 INJECTION INTRAMUSCULAR; INTRAVENOUS at 08:29

## 2024-07-12 RX ADMIN — SODIUM CHLORIDE, PRESERVATIVE FREE 40 MG: 5 INJECTION INTRAVENOUS at 23:30

## 2024-07-12 RX ADMIN — INSULIN LISPRO 2 UNITS: 100 INJECTION, SOLUTION INTRAVENOUS; SUBCUTANEOUS at 11:58

## 2024-07-12 RX ADMIN — FENTANYL CITRATE 25 MCG: 0.05 INJECTION, SOLUTION INTRAMUSCULAR; INTRAVENOUS at 00:44

## 2024-07-12 RX ADMIN — PROCHLORPERAZINE EDISYLATE 10 MG: 5 INJECTION INTRAMUSCULAR; INTRAVENOUS at 17:42

## 2024-07-12 RX ADMIN — FENTANYL CITRATE 25 MCG: 0.05 INJECTION, SOLUTION INTRAMUSCULAR; INTRAVENOUS at 11:58

## 2024-07-12 RX ADMIN — SODIUM CHLORIDE, POTASSIUM CHLORIDE, SODIUM LACTATE AND CALCIUM CHLORIDE: 600; 310; 30; 20 INJECTION, SOLUTION INTRAVENOUS at 12:06

## 2024-07-12 RX ADMIN — FENTANYL CITRATE 25 MCG: 0.05 INJECTION, SOLUTION INTRAMUSCULAR; INTRAVENOUS at 15:03

## 2024-07-12 RX ADMIN — FENTANYL CITRATE 25 MCG: 0.05 INJECTION, SOLUTION INTRAMUSCULAR; INTRAVENOUS at 23:30

## 2024-07-12 RX ADMIN — PROCHLORPERAZINE EDISYLATE 10 MG: 5 INJECTION INTRAMUSCULAR; INTRAVENOUS at 23:30

## 2024-07-12 RX ADMIN — INSULIN LISPRO 2 UNITS: 100 INJECTION, SOLUTION INTRAVENOUS; SUBCUTANEOUS at 08:29

## 2024-07-12 RX ADMIN — METOCLOPRAMIDE HYDROCHLORIDE 10 MG: 5 INJECTION INTRAMUSCULAR; INTRAVENOUS at 13:37

## 2024-07-12 RX ADMIN — SODIUM CHLORIDE, POTASSIUM CHLORIDE, SODIUM LACTATE AND CALCIUM CHLORIDE: 600; 310; 30; 20 INJECTION, SOLUTION INTRAVENOUS at 04:09

## 2024-07-12 RX ADMIN — FENTANYL CITRATE 25 MCG: 0.05 INJECTION, SOLUTION INTRAMUSCULAR; INTRAVENOUS at 19:36

## 2024-07-12 RX ADMIN — FAMOTIDINE 20 MG: 10 INJECTION, SOLUTION INTRAVENOUS at 21:53

## 2024-07-12 RX ADMIN — HYDRALAZINE HYDROCHLORIDE 10 MG: 20 INJECTION INTRAMUSCULAR; INTRAVENOUS at 08:35

## 2024-07-12 RX ADMIN — PROCHLORPERAZINE EDISYLATE 10 MG: 5 INJECTION INTRAMUSCULAR; INTRAVENOUS at 03:57

## 2024-07-12 RX ADMIN — SODIUM CHLORIDE, PRESERVATIVE FREE 40 MG: 5 INJECTION INTRAVENOUS at 11:58

## 2024-07-12 RX ADMIN — SODIUM CHLORIDE, PRESERVATIVE FREE 40 MG: 5 INJECTION INTRAVENOUS at 00:44

## 2024-07-12 RX ADMIN — DIPHENHYDRAMINE HYDROCHLORIDE 25 MG: 50 INJECTION INTRAMUSCULAR; INTRAVENOUS at 21:53

## 2024-07-12 RX ADMIN — ENOXAPARIN SODIUM 40 MG: 100 INJECTION SUBCUTANEOUS at 08:30

## 2024-07-12 RX ADMIN — METOCLOPRAMIDE HYDROCHLORIDE 10 MG: 5 INJECTION INTRAMUSCULAR; INTRAVENOUS at 19:36

## 2024-07-12 RX ADMIN — FENTANYL CITRATE 25 MCG: 0.05 INJECTION, SOLUTION INTRAMUSCULAR; INTRAVENOUS at 06:50

## 2024-07-12 RX ADMIN — INSULIN LISPRO 1 UNITS: 100 INJECTION, SOLUTION INTRAVENOUS; SUBCUTANEOUS at 17:42

## 2024-07-12 RX ADMIN — METOCLOPRAMIDE HYDROCHLORIDE 10 MG: 5 INJECTION INTRAMUSCULAR; INTRAVENOUS at 00:44

## 2024-07-12 ASSESSMENT — PAIN DESCRIPTION - DESCRIPTORS
DESCRIPTORS: ACHING
DESCRIPTORS: STABBING
DESCRIPTORS: ACHING
DESCRIPTORS: THROBBING;SPASM
DESCRIPTORS: ACHING

## 2024-07-12 ASSESSMENT — PAIN DESCRIPTION - LOCATION
LOCATION: ABDOMEN

## 2024-07-12 ASSESSMENT — PAIN SCALES - GENERAL
PAINLEVEL_OUTOF10: 5
PAINLEVEL_OUTOF10: 8
PAINLEVEL_OUTOF10: 3
PAINLEVEL_OUTOF10: 9
PAINLEVEL_OUTOF10: 8
PAINLEVEL_OUTOF10: 7
PAINLEVEL_OUTOF10: 7
PAINLEVEL_OUTOF10: 8
PAINLEVEL_OUTOF10: 6
PAINLEVEL_OUTOF10: 7

## 2024-07-12 ASSESSMENT — PAIN - FUNCTIONAL ASSESSMENT
PAIN_FUNCTIONAL_ASSESSMENT: ACTIVITIES ARE NOT PREVENTED
PAIN_FUNCTIONAL_ASSESSMENT: PREVENTS OR INTERFERES SOME ACTIVE ACTIVITIES AND ADLS
PAIN_FUNCTIONAL_ASSESSMENT: ACTIVITIES ARE NOT PREVENTED

## 2024-07-12 ASSESSMENT — PAIN DESCRIPTION - ONSET
ONSET: ON-GOING
ONSET: ON-GOING

## 2024-07-12 ASSESSMENT — PAIN DESCRIPTION - ORIENTATION
ORIENTATION: MID
ORIENTATION: LOWER
ORIENTATION: MID
ORIENTATION: MID

## 2024-07-12 ASSESSMENT — PAIN DESCRIPTION - PAIN TYPE
TYPE: ACUTE PAIN
TYPE: ACUTE PAIN

## 2024-07-12 ASSESSMENT — PAIN DESCRIPTION - FREQUENCY
FREQUENCY: CONTINUOUS
FREQUENCY: CONTINUOUS

## 2024-07-13 PROBLEM — R33.9 URINARY RETENTION: Status: ACTIVE | Noted: 2024-07-13

## 2024-07-13 PROBLEM — R11.2 INTRACTABLE NAUSEA AND VOMITING: Status: ACTIVE | Noted: 2024-07-13

## 2024-07-13 PROBLEM — E11.65 UNCONTROLLED TYPE 2 DIABETES MELLITUS WITH HYPERGLYCEMIA (HCC): Status: ACTIVE | Noted: 2024-07-13

## 2024-07-13 PROBLEM — D50.0 ANEMIA DUE TO CHRONIC BLOOD LOSS: Status: ACTIVE | Noted: 2024-07-13

## 2024-07-13 LAB
ANION GAP SERPL CALCULATED.3IONS-SCNC: 12 MMOL/L (ref 9–17)
BACTERIA URNS QL MICRO: ABNORMAL
BASOPHILS # BLD: 0 K/UL (ref 0–0.2)
BASOPHILS NFR BLD: 0 %
BILIRUB UR QL STRIP: NEGATIVE
BUN SERPL-MCNC: 6 MG/DL (ref 6–20)
BUN/CREAT SERPL: 10 (ref 9–20)
CALCIUM SERPL-MCNC: 8.7 MG/DL (ref 8.6–10.4)
CHLORIDE SERPL-SCNC: 102 MMOL/L (ref 98–107)
CLARITY UR: ABNORMAL
CO2 SERPL-SCNC: 23 MMOL/L (ref 20–31)
COLOR UR: YELLOW
CREAT SERPL-MCNC: 0.6 MG/DL (ref 0.5–0.9)
EOSINOPHIL # BLD: 0 K/UL (ref 0–0.4)
EOSINOPHILS RELATIVE PERCENT: 0 % (ref 1–4)
EPI CELLS #/AREA URNS HPF: ABNORMAL /HPF (ref 0–5)
ERYTHROCYTE [DISTWIDTH] IN BLOOD BY AUTOMATED COUNT: 21.4 % (ref 11.8–14.4)
GFR, ESTIMATED: >90 ML/MIN/1.73M2
GLUCOSE BLD-MCNC: 146 MG/DL (ref 65–105)
GLUCOSE BLD-MCNC: 164 MG/DL (ref 65–105)
GLUCOSE BLD-MCNC: 168 MG/DL (ref 65–105)
GLUCOSE BLD-MCNC: 182 MG/DL (ref 65–105)
GLUCOSE BLD-MCNC: 184 MG/DL (ref 65–105)
GLUCOSE BLD-MCNC: 191 MG/DL (ref 65–105)
GLUCOSE BLD-MCNC: 213 MG/DL (ref 65–105)
GLUCOSE SERPL-MCNC: 181 MG/DL (ref 70–99)
GLUCOSE UR STRIP-MCNC: NEGATIVE MG/DL
HCT VFR BLD AUTO: 29.1 % (ref 36.3–47.1)
HGB BLD-MCNC: 7.8 G/DL (ref 11.9–15.1)
HGB UR QL STRIP.AUTO: NEGATIVE
IMM GRANULOCYTES # BLD AUTO: 0 K/UL (ref 0–0.3)
IMM GRANULOCYTES NFR BLD: 0 %
KETONES UR STRIP-MCNC: ABNORMAL MG/DL
LEUKOCYTE ESTERASE UR QL STRIP: ABNORMAL
LYMPHOCYTES NFR BLD: 2.9 K/UL (ref 1–4.8)
LYMPHOCYTES RELATIVE PERCENT: 25 % (ref 24–44)
MCH RBC QN AUTO: 16 PG (ref 25.2–33.5)
MCHC RBC AUTO-ENTMCNC: 26.8 G/DL (ref 28.4–34.8)
MCV RBC AUTO: 59.9 FL (ref 82.6–102.9)
MONOCYTES NFR BLD: 0.81 K/UL (ref 0.2–0.8)
MONOCYTES NFR BLD: 7 % (ref 1–7)
MORPHOLOGY: ABNORMAL
NEUTROPHILS NFR BLD: 68 % (ref 36–66)
NEUTS SEG NFR BLD: 7.89 K/UL (ref 1.8–7.7)
NITRITE UR QL STRIP: NEGATIVE
NRBC BLD-RTO: 0 PER 100 WBC
PH UR STRIP: 6.5 [PH] (ref 5–8)
PLATELET # BLD AUTO: 323 K/UL (ref 138–453)
PMV BLD AUTO: 9.1 FL (ref 8.1–13.5)
POTASSIUM SERPL-SCNC: 3.6 MMOL/L (ref 3.7–5.3)
PROT UR STRIP-MCNC: NEGATIVE MG/DL
RBC # BLD AUTO: 4.86 M/UL (ref 3.95–5.11)
RBC #/AREA URNS HPF: ABNORMAL /HPF (ref 0–2)
SODIUM SERPL-SCNC: 137 MMOL/L (ref 135–144)
SP GR UR STRIP: 1.01 (ref 1–1.03)
UROBILINOGEN UR STRIP-ACNC: NORMAL EU/DL (ref 0–1)
WBC #/AREA URNS HPF: ABNORMAL /HPF (ref 0–5)
WBC OTHER # BLD: 11.6 K/UL (ref 3.5–11.3)

## 2024-07-13 PROCEDURE — 51798 US URINE CAPACITY MEASURE: CPT

## 2024-07-13 PROCEDURE — 2580000003 HC RX 258: Performed by: FAMILY MEDICINE

## 2024-07-13 PROCEDURE — 2580000003 HC RX 258

## 2024-07-13 PROCEDURE — 2060000000 HC ICU INTERMEDIATE R&B

## 2024-07-13 PROCEDURE — 85025 COMPLETE CBC W/AUTO DIFF WBC: CPT

## 2024-07-13 PROCEDURE — 36415 COLL VENOUS BLD VENIPUNCTURE: CPT

## 2024-07-13 PROCEDURE — 6360000002 HC RX W HCPCS: Performed by: FAMILY MEDICINE

## 2024-07-13 PROCEDURE — 80048 BASIC METABOLIC PNL TOTAL CA: CPT

## 2024-07-13 PROCEDURE — 6360000002 HC RX W HCPCS: Performed by: INTERNAL MEDICINE

## 2024-07-13 PROCEDURE — 6360000002 HC RX W HCPCS: Performed by: NURSE PRACTITIONER

## 2024-07-13 PROCEDURE — 6370000000 HC RX 637 (ALT 250 FOR IP)

## 2024-07-13 PROCEDURE — 51702 INSERT TEMP BLADDER CATH: CPT

## 2024-07-13 PROCEDURE — 6360000002 HC RX W HCPCS

## 2024-07-13 PROCEDURE — 82947 ASSAY GLUCOSE BLOOD QUANT: CPT

## 2024-07-13 PROCEDURE — 99232 SBSQ HOSP IP/OBS MODERATE 35: CPT | Performed by: FAMILY MEDICINE

## 2024-07-13 PROCEDURE — 2580000003 HC RX 258: Performed by: INTERNAL MEDICINE

## 2024-07-13 PROCEDURE — 81001 URINALYSIS AUTO W/SCOPE: CPT

## 2024-07-13 PROCEDURE — 6370000000 HC RX 637 (ALT 250 FOR IP): Performed by: FAMILY MEDICINE

## 2024-07-13 RX ORDER — BISACODYL 10 MG
10 SUPPOSITORY, RECTAL RECTAL ONCE
Status: DISCONTINUED | OUTPATIENT
Start: 2024-07-13 | End: 2024-07-13

## 2024-07-13 RX ORDER — ONDANSETRON 2 MG/ML
4 INJECTION INTRAMUSCULAR; INTRAVENOUS EVERY 6 HOURS PRN
Status: DISCONTINUED | OUTPATIENT
Start: 2024-07-13 | End: 2024-07-14 | Stop reason: HOSPADM

## 2024-07-13 RX ORDER — LORAZEPAM 2 MG/ML
0.5 INJECTION INTRAMUSCULAR EVERY 6 HOURS PRN
Status: DISCONTINUED | OUTPATIENT
Start: 2024-07-13 | End: 2024-07-14 | Stop reason: HOSPADM

## 2024-07-13 RX ORDER — PAROXETINE HYDROCHLORIDE 20 MG/1
20 TABLET, FILM COATED ORAL EVERY MORNING
Status: DISCONTINUED | OUTPATIENT
Start: 2024-07-14 | End: 2024-07-14 | Stop reason: HOSPADM

## 2024-07-13 RX ORDER — METOPROLOL SUCCINATE 50 MG/1
50 TABLET, EXTENDED RELEASE ORAL DAILY
Status: DISCONTINUED | OUTPATIENT
Start: 2024-07-13 | End: 2024-07-14 | Stop reason: HOSPADM

## 2024-07-13 RX ORDER — INSULIN GLARGINE 100 [IU]/ML
30 INJECTION, SOLUTION SUBCUTANEOUS NIGHTLY
Status: DISCONTINUED | OUTPATIENT
Start: 2024-07-13 | End: 2024-07-14 | Stop reason: HOSPADM

## 2024-07-13 RX ORDER — LOSARTAN POTASSIUM 50 MG/1
50 TABLET ORAL DAILY
Status: DISCONTINUED | OUTPATIENT
Start: 2024-07-13 | End: 2024-07-14 | Stop reason: HOSPADM

## 2024-07-13 RX ORDER — BISACODYL 10 MG
10 SUPPOSITORY, RECTAL RECTAL ONCE
Status: COMPLETED | OUTPATIENT
Start: 2024-07-13 | End: 2024-07-13

## 2024-07-13 RX ORDER — PAROXETINE HYDROCHLORIDE 20 MG/1
30 TABLET, FILM COATED ORAL EVERY MORNING
Status: DISCONTINUED | OUTPATIENT
Start: 2024-07-14 | End: 2024-07-14 | Stop reason: HOSPADM

## 2024-07-13 RX ADMIN — SODIUM CHLORIDE, PRESERVATIVE FREE 40 MG: 5 INJECTION INTRAVENOUS at 23:37

## 2024-07-13 RX ADMIN — ERYTHROMYCIN LACTOBIONATE 250 MG: 500 INJECTION, POWDER, LYOPHILIZED, FOR SOLUTION INTRAVENOUS at 12:42

## 2024-07-13 RX ADMIN — INSULIN GLARGINE 30 UNITS: 100 INJECTION, SOLUTION SUBCUTANEOUS at 20:38

## 2024-07-13 RX ADMIN — LORAZEPAM 0.5 MG: 2 INJECTION INTRAMUSCULAR; INTRAVENOUS at 18:36

## 2024-07-13 RX ADMIN — BISACODYL 10 MG: 10 SUPPOSITORY RECTAL at 18:53

## 2024-07-13 RX ADMIN — LORAZEPAM 0.5 MG: 2 INJECTION INTRAMUSCULAR; INTRAVENOUS at 12:01

## 2024-07-13 RX ADMIN — LORAZEPAM 0.5 MG: 2 INJECTION INTRAMUSCULAR; INTRAVENOUS at 02:06

## 2024-07-13 RX ADMIN — METOCLOPRAMIDE HYDROCHLORIDE 10 MG: 5 INJECTION INTRAMUSCULAR; INTRAVENOUS at 15:42

## 2024-07-13 RX ADMIN — SODIUM CHLORIDE 200 MG: 9 INJECTION, SOLUTION INTRAVENOUS at 15:48

## 2024-07-13 RX ADMIN — SODIUM CHLORIDE, PRESERVATIVE FREE 10 ML: 5 INJECTION INTRAVENOUS at 08:25

## 2024-07-13 RX ADMIN — INSULIN LISPRO 1 UNITS: 100 INJECTION, SOLUTION INTRAVENOUS; SUBCUTANEOUS at 12:29

## 2024-07-13 RX ADMIN — SODIUM CHLORIDE, POTASSIUM CHLORIDE, SODIUM LACTATE AND CALCIUM CHLORIDE: 600; 310; 30; 20 INJECTION, SOLUTION INTRAVENOUS at 12:33

## 2024-07-13 RX ADMIN — METOCLOPRAMIDE HYDROCHLORIDE 10 MG: 5 INJECTION INTRAMUSCULAR; INTRAVENOUS at 20:38

## 2024-07-13 RX ADMIN — ERYTHROMYCIN LACTOBIONATE 250 MG: 500 INJECTION, POWDER, LYOPHILIZED, FOR SOLUTION INTRAVENOUS at 19:16

## 2024-07-13 RX ADMIN — SODIUM CHLORIDE, PRESERVATIVE FREE 40 MG: 5 INJECTION INTRAVENOUS at 12:01

## 2024-07-13 RX ADMIN — METOCLOPRAMIDE HYDROCHLORIDE 10 MG: 5 INJECTION INTRAMUSCULAR; INTRAVENOUS at 08:42

## 2024-07-13 RX ADMIN — METOCLOPRAMIDE HYDROCHLORIDE 10 MG: 5 INJECTION INTRAMUSCULAR; INTRAVENOUS at 01:55

## 2024-07-13 RX ADMIN — PROCHLORPERAZINE EDISYLATE 10 MG: 5 INJECTION INTRAMUSCULAR; INTRAVENOUS at 12:29

## 2024-07-13 RX ADMIN — ENOXAPARIN SODIUM 40 MG: 100 INJECTION SUBCUTANEOUS at 08:24

## 2024-07-13 RX ADMIN — FENTANYL CITRATE 25 MCG: 0.05 INJECTION, SOLUTION INTRAMUSCULAR; INTRAVENOUS at 08:24

## 2024-07-13 RX ADMIN — HYDRALAZINE HYDROCHLORIDE 10 MG: 20 INJECTION INTRAMUSCULAR; INTRAVENOUS at 08:42

## 2024-07-13 ASSESSMENT — PAIN DESCRIPTION - DESCRIPTORS
DESCRIPTORS: SHARP

## 2024-07-13 ASSESSMENT — PAIN DESCRIPTION - LOCATION
LOCATION: ABDOMEN

## 2024-07-13 ASSESSMENT — PAIN - FUNCTIONAL ASSESSMENT
PAIN_FUNCTIONAL_ASSESSMENT: ACTIVITIES ARE NOT PREVENTED

## 2024-07-13 ASSESSMENT — PAIN DESCRIPTION - ORIENTATION
ORIENTATION: UPPER

## 2024-07-13 ASSESSMENT — PAIN SCALES - GENERAL
PAINLEVEL_OUTOF10: 6
PAINLEVEL_OUTOF10: 5
PAINLEVEL_OUTOF10: 7
PAINLEVEL_OUTOF10: 6
PAINLEVEL_OUTOF10: 7

## 2024-07-13 NOTE — CARE COORDINATION
DC Planning    CHART REVIEW    Pt not tolerating diet-eligible for med assist-will need to send rxs to [facility] if weekend dc. Will need pcp list. Full liq diet.

## 2024-07-14 VITALS
DIASTOLIC BLOOD PRESSURE: 81 MMHG | RESPIRATION RATE: 14 BRPM | BODY MASS INDEX: 38.1 KG/M2 | WEIGHT: 189 LBS | OXYGEN SATURATION: 96 % | SYSTOLIC BLOOD PRESSURE: 156 MMHG | HEIGHT: 59 IN | TEMPERATURE: 98.1 F | HEART RATE: 107 BPM

## 2024-07-14 LAB
ANION GAP SERPL CALCULATED.3IONS-SCNC: 11 MMOL/L (ref 9–17)
BUN SERPL-MCNC: 9 MG/DL (ref 6–20)
BUN/CREAT SERPL: 13 (ref 9–20)
CALCIUM SERPL-MCNC: 8.5 MG/DL (ref 8.6–10.4)
CHLORIDE SERPL-SCNC: 103 MMOL/L (ref 98–107)
CO2 SERPL-SCNC: 24 MMOL/L (ref 20–31)
CREAT SERPL-MCNC: 0.7 MG/DL (ref 0.5–0.9)
GFR, ESTIMATED: >90 ML/MIN/1.73M2
GLUCOSE BLD-MCNC: 119 MG/DL (ref 65–105)
GLUCOSE BLD-MCNC: 88 MG/DL (ref 65–105)
GLUCOSE SERPL-MCNC: 89 MG/DL (ref 70–99)
MAGNESIUM SERPL-MCNC: 1.9 MG/DL (ref 1.6–2.6)
POTASSIUM SERPL-SCNC: 3.1 MMOL/L (ref 3.7–5.3)
SODIUM SERPL-SCNC: 138 MMOL/L (ref 135–144)

## 2024-07-14 PROCEDURE — 2580000003 HC RX 258: Performed by: INTERNAL MEDICINE

## 2024-07-14 PROCEDURE — 2580000003 HC RX 258: Performed by: FAMILY MEDICINE

## 2024-07-14 PROCEDURE — 6360000002 HC RX W HCPCS: Performed by: FAMILY MEDICINE

## 2024-07-14 PROCEDURE — 99238 HOSP IP/OBS DSCHRG MGMT 30/<: CPT | Performed by: FAMILY MEDICINE

## 2024-07-14 PROCEDURE — 6360000002 HC RX W HCPCS: Performed by: NURSE PRACTITIONER

## 2024-07-14 PROCEDURE — 6370000000 HC RX 637 (ALT 250 FOR IP): Performed by: FAMILY MEDICINE

## 2024-07-14 PROCEDURE — 6360000002 HC RX W HCPCS: Performed by: INTERNAL MEDICINE

## 2024-07-14 PROCEDURE — 6370000000 HC RX 637 (ALT 250 FOR IP)

## 2024-07-14 PROCEDURE — 36415 COLL VENOUS BLD VENIPUNCTURE: CPT

## 2024-07-14 PROCEDURE — 83735 ASSAY OF MAGNESIUM: CPT

## 2024-07-14 PROCEDURE — 6360000002 HC RX W HCPCS

## 2024-07-14 PROCEDURE — 2580000003 HC RX 258

## 2024-07-14 PROCEDURE — 82947 ASSAY GLUCOSE BLOOD QUANT: CPT

## 2024-07-14 PROCEDURE — 80048 BASIC METABOLIC PNL TOTAL CA: CPT

## 2024-07-14 RX ORDER — METOCLOPRAMIDE 10 MG/1
10 TABLET ORAL 3 TIMES DAILY PRN
Qty: 90 TABLET | Refills: 0 | Status: SHIPPED | OUTPATIENT
Start: 2024-07-14

## 2024-07-14 RX ORDER — ASCORBIC ACID 500 MG
500 TABLET ORAL DAILY
Qty: 30 TABLET | Refills: 3 | Status: SHIPPED | OUTPATIENT
Start: 2024-07-14

## 2024-07-14 RX ORDER — FERROUS SULFATE 325(65) MG
325 TABLET ORAL
Qty: 180 TABLET | Refills: 1 | Status: SHIPPED | OUTPATIENT
Start: 2024-07-14

## 2024-07-14 RX ADMIN — SODIUM CHLORIDE, PRESERVATIVE FREE 40 MG: 5 INJECTION INTRAVENOUS at 10:50

## 2024-07-14 RX ADMIN — ENOXAPARIN SODIUM 40 MG: 100 INJECTION SUBCUTANEOUS at 08:02

## 2024-07-14 RX ADMIN — SODIUM CHLORIDE, PRESERVATIVE FREE 10 ML: 5 INJECTION INTRAVENOUS at 08:03

## 2024-07-14 RX ADMIN — ERYTHROMYCIN LACTOBIONATE 250 MG: 500 INJECTION, POWDER, LYOPHILIZED, FOR SOLUTION INTRAVENOUS at 03:01

## 2024-07-14 RX ADMIN — DOCUSATE SODIUM 100 MG: 100 CAPSULE, LIQUID FILLED ORAL at 08:03

## 2024-07-14 RX ADMIN — PAROXETINE HYDROCHLORIDE 30 MG: 20 TABLET, FILM COATED ORAL at 08:05

## 2024-07-14 RX ADMIN — ERYTHROMYCIN LACTOBIONATE 250 MG: 500 INJECTION, POWDER, LYOPHILIZED, FOR SOLUTION INTRAVENOUS at 10:50

## 2024-07-14 RX ADMIN — POTASSIUM BICARBONATE 40 MEQ: 782 TABLET, EFFERVESCENT ORAL at 09:06

## 2024-07-14 RX ADMIN — LOSARTAN POTASSIUM 50 MG: 50 TABLET, FILM COATED ORAL at 08:03

## 2024-07-14 RX ADMIN — METOCLOPRAMIDE HYDROCHLORIDE 10 MG: 5 INJECTION INTRAMUSCULAR; INTRAVENOUS at 03:01

## 2024-07-14 RX ADMIN — METOCLOPRAMIDE HYDROCHLORIDE 10 MG: 5 INJECTION INTRAMUSCULAR; INTRAVENOUS at 08:02

## 2024-07-14 RX ADMIN — PAROXETINE HYDROCHLORIDE 20 MG: 20 TABLET, FILM COATED ORAL at 08:02

## 2024-07-14 RX ADMIN — METOPROLOL SUCCINATE 50 MG: 50 TABLET, EXTENDED RELEASE ORAL at 08:03

## 2024-07-14 NOTE — PLAN OF CARE
Patient alert and oriented; withdrawn/flat. VSS. ST on tele. SpO2 mid 90s on RA. Afebrile. Patient having pain on their abdomen and rates it an 8. Pain interventions include medication and rest. Patients goal for pain relief is 5. The need for pain and symptom management will be considered in the discharge planning process to ensure patients comfort. Pt receiving IVF. C/o nausea; benadryl, pepcid, protonix, compazine, and reglan given. No emesis; tolerated some ice chips. PRN ativan given for anxiety. Will d/c home once medically stable. Bed in low position, call light within reach. Will continue to monitor.      Problem: Safety - Adult  Goal: Free from fall injury  7/13/2024 0432 by Vijaya Valenzuela RN  Outcome: Progressing  7/12/2024 1839 by Reyes, Brittnie, RN  Outcome: Progressing     Problem: Discharge Planning  Goal: Discharge to home or other facility with appropriate resources  7/13/2024 0432 by Vijaya Valenzuela RN  Outcome: Progressing  7/12/2024 1839 by Reyes, Brittnie, RN  Outcome: Progressing     Problem: Musculoskeletal - Adult  Goal: Return mobility to safest level of function  7/13/2024 0432 by Vijaya Valenzuela RN  Outcome: Progressing  7/12/2024 1839 by Reyes, Brittnie, RN  Outcome: Progressing     Problem: Gastrointestinal - Adult  Goal: Minimal or absence of nausea and vomiting  7/13/2024 0432 by Vijaya Valenzuela RN  Outcome: Progressing  7/12/2024 1839 by Reyes, Brittnie, RN  Outcome: Progressing  Goal: Maintains adequate nutritional intake  7/13/2024 0432 by Vijaya Valenzuela RN  Outcome: Progressing  7/12/2024 1839 by Reyes, Brittnie, RN  Outcome: Progressing     Problem: Pain  Goal: Verbalizes/displays adequate comfort level or baseline comfort level  7/13/2024 0432 by Vijaya Valenzuela RN  Outcome: Progressing  7/12/2024 1839 by Reyes, Brittnie, RN  Outcome: Progressing     Problem: Chronic Conditions and Co-morbidities  Goal: Patient's chronic conditions and co-morbidity symptoms are monitored and 
Patient continues to complain of abdominal pain. PRN fentanyl given Q4. Patient also complains of nausea and has some episodes of emesis today. Receiving IV reglan ordered Q6 and prn compazine. Diet advanced to low carb but patient is unable to tolerate even ice chips without vomiting. 800mL urine output this shift. CT abdomen showed fecal impaction, patient has had no bowel movements today. LR running at 125mL/hr. BP elevated this morning, IV hydrlazine given this morning with good effect. VSS, call light within reach, safety maintained        Problem: Safety - Adult  Goal: Free from fall injury  Outcome: Progressing     Problem: Discharge Planning  Goal: Discharge to home or other facility with appropriate resources  Outcome: Progressing     Problem: Musculoskeletal - Adult  Goal: Return mobility to safest level of function  Outcome: Progressing     Problem: Gastrointestinal - Adult  Goal: Minimal or absence of nausea and vomiting  Outcome: Progressing  Goal: Maintains adequate nutritional intake  Outcome: Progressing     Problem: Pain  Goal: Verbalizes/displays adequate comfort level or baseline comfort level  Outcome: Progressing. Patient having pain on their abdomen and rates it a 7. Pain interventions includerelaxation techniques and medication. Patients goal for pain relief is 1. The need for pain and symptom management will be considered in the discharge planning process to ensure patients comfort.        Problem: Chronic Conditions and Co-morbidities  Goal: Patient's chronic conditions and co-morbidity symptoms are monitored and maintained or improved  Outcome: Progressing     Problem: Skin/Tissue Integrity  Goal: Absence of new skin breakdown  Description: 1.  Monitor for areas of redness and/or skin breakdown  2.  Assess vascular access sites hourly  3.  Every 4-6 hours minimum:  Change oxygen saturation probe site  4.  Every 4-6 hours:  If on nasal continuous positive airway pressure, respiratory therapy 
Pt A&Ox4 during shift has been anxious throughout majority of the day receiving PRN IV Ativan Q6hr. She has remained free from falls and injuries independent and calls out when needing assistance.   Pt started on IV Erythromycin Q8hr during this shift for Gastroparesis to attempt to improve GI motility IV Fentanyl discontinued during this shift as a secondary attempt to improve GI motility. No bowel movement occurrences during this shift. Pt having nausea one episode of emesis this morning bilious and mucous in appearance.   One time dose Dulcolax suppository ordered pt had visitors this afternoon when suppository was ordered and wanted to wait until after visitors has left once visitors had left she wanted to wait until time PRN ativan was due, writer attempted again at 1836 when administering ativan  and pt stated she wanted to wait until Ativan had began to take effect.     She has been consuming ice chips during this shift as this is what she says she can tolerate and has LR running at 125 mL/hr.   She refused scheduled oral meds this morning stating she would throw up if taking any pills.   Blood pressure has been elevated during the morning and afternoon SBP running in 170s (Refer to flowsheets for vital signs) pt did not take scheduled Lisinopril this morning due to nausea and PRN Hydralazine given. This afternoon SBP remained 170 attending notified due to pt not being due for Hydralazine. /87 this evening.   All other Vital signs stable pt alternating between normal sinus and sinus tach on monitor -110s.  Patient having pain on their upper abdomen and rates it a 7. Pain interventions includefrequent position changes, regular rest periods, medicate per physician recommendation/order, and heating pad . Patients goal for pain relief is 5. The need for pain and symptom management will be considered in the discharge planning process to ensure patients comfort.    Pt retaining urine this morning 
Pt admitted for diabetic gastroparesis. Pain has been controlled with PRN Fentanyl and a one time dose of IM Bentyl. Pain persist throughout the night. Heating pad applied to abdomen.   She had one episode of emesis which appeared to be mostly bile. Nausea controlled with scheduled Reglan. Pt satisfied.   BP elevated, PRN hydralazine given. See MAR.   No blood sugar coverage given, .   Pt tachycardic in 120's-130's overnight while resting, one time dose of IV Lopressor given. HR now 100-105.   She complains of burning with urination as well as urine being tea colored and malodorous, NP Mello notified, new UA with reflex to culture ordered.  She remains A/O x4 and withdrawn, sinus tachy and on room air.   All needs have been met at this time, safety maintained.    Problem: Safety - Adult  Goal: Free from fall injury  7/12/2024 0305 by Eli Taveras RN  Outcome: Progressing     Problem: Discharge Planning  Goal: Discharge to home or other facility with appropriate resources  7/12/2024 0305 by Eli Taveras RN  Outcome: Progressing  Flowsheets (Taken 7/11/2024 2029)  Discharge to home or other facility with appropriate resources: Identify barriers to discharge with patient and caregiver     Problem: Musculoskeletal - Adult  Goal: Return mobility to safest level of function  7/12/2024 0305 by Eli Taveras RN  Outcome: Progressing  Flowsheets (Taken 7/11/2024 2029)  Return Mobility to Safest Level of Function: Assess patient stability and activity tolerance for standing, transferring and ambulating with or without assistive devices     Problem: Gastrointestinal - Adult  Goal: Minimal or absence of nausea and vomiting  7/12/2024 0305 by Eli Taveras RN  Outcome: Progressing  Flowsheets (Taken 7/11/2024 2029)  Minimal or absence of nausea and vomiting: Administer IV fluids as ordered to ensure adequate hydration  Goal: Maintains adequate nutritional intake  7/12/2024 0305 by Eli Taveras RN  Outcome: 
Pt is currently resting in bed A&OX4 but very lethargic. She has been admitted for diabetic gastroparesis. Admission database completed to fullest extent. Pt had an episode of incontinence and fully saturated brief while vomiting. Pt requested to continue after pain and nausea and vomiting are under control. Small amount of blood tinged emesis throughout my shift. Pt has vomited multiple times and is experiencing ABD pain. PRN zofran, reglan, and compazine have been given throughout the night. Pain is being controlled via morphine. Bedside commode in room. LR continuously infusing. Pt complained of ABD pain this morning and was unable to urinate. Bladder scan completed. 1275 ml being retained. Writer sent secure message to on call NP, awaiting orders.     Standard safety measures in place. Bed in locked and lowest position. Bed alarm on. Care ongoing. Safety has been maintained throughout the shift.      Problem: Pain: Patient having pain on their abdomen and rates it a 10. Pain interventions includemedication. Patients goal for pain relief is 1. The need for pain and symptom management will be considered in the discharge planning process to ensure patients comfort.      Goal: Verbalizes/displays adequate comfort level or baseline comfort level  Outcome: Progressing    Problem: Safety - Adult  Goal: Free from fall injury  Outcome: Progressing     Problem: Discharge Planning  Goal: Discharge to home or other facility with appropriate resources  Outcome: Progressing     Problem: Musculoskeletal - Adult  Goal: Return mobility to safest level of function  Outcome: Progressing     Problem: Gastrointestinal - Adult  Goal: Minimal or absence of nausea and vomiting  Outcome: Progressing  Goal: Maintains adequate nutritional intake  Outcome: Progressing     
Pt oriented x 4. Pt to bedside commode ad josé.  Milk of molasses enema ordered this AM, pt refused, states to not need it at this time.  IV fluids and Erythromycin continued.   Pt has no c/o pain this morning.  No nausea, tolerating full liquid diet.   [place] remains in place and draining, output charted.  K 3.1 40 meq of effor tabs given per sliding scale.     Problem: Safety - Adult  Goal: Free from fall injury  7/14/2024 0828 by Megan Rehman, RN  Outcome: Progressing     Problem: Discharge Planning  Goal: Discharge to home or other facility with appropriate resources  7/14/2024 0828 by Megan Rehman, RN  Outcome: Progressing     Problem: Musculoskeletal - Adult  Goal: Return mobility to safest level of function  7/14/2024 0828 by Megan Rehman RN  Outcome: Progressing     Problem: Gastrointestinal - Adult  Goal: Minimal or absence of nausea and vomiting  7/14/2024 0828 by Megan Rehman RN  Outcome: Progressing  Flowsheets (Taken 7/14/2024 0810)  Minimal or absence of nausea and vomiting: Administer IV fluids as ordered to ensure adequate hydration     Problem: Gastrointestinal - Adult  Goal: Maintains adequate nutritional intake  7/14/2024 0828 by Megan Rehman RN  Outcome: Progressing  Flowsheets (Taken 7/14/2024 0810)  Maintains adequate nutritional intake: Monitor percentage of each meal consumed     Problem: Pain  Goal: Verbalizes/displays adequate comfort level or baseline comfort level  7/14/2024 0828 by Megan Rehman, RN  Outcome: Progressing     Problem: Chronic Conditions and Co-morbidities  Goal: Patient's chronic conditions and co-morbidity symptoms are monitored and maintained or improved  7/14/2024 0828 by Megan Rehman, RN  Outcome: Progressing     
Absence of new skin breakdown  Description: 1.  Monitor for areas of redness and/or skin breakdown  2.  Assess vascular access sites hourly  3.  Every 4-6 hours minimum:  Change oxygen saturation probe site  4.  Every 4-6 hours:  If on nasal continuous positive airway pressure, respiratory therapy assess nares and determine need for appliance change or resting period.  7/14/2024 0207 by Vijaya Valenzuela, RN  Outcome: Progressing  7/13/2024 1757 by Zoey Mendoza, RN  Outcome: Progressing     
and maintained or improved  Outcome: Progressing

## 2024-07-14 NOTE — DISCHARGE SUMMARY
progress note from day of discharge.    Discharge plan:     Disposition: Home    Physician Follow Up: PCP - needs new PCP as she moved from [place] to [place] few weeks ago.   Patient also plan to visit her home state in August and has set up appointmnet with her PCP in [place].     No follow-up provider specified.     Requiring Further Evaluation/Follow Up POST HOSPITALIZATION/Incidental Findings: as above     Diet: diabetic diet and Gastroparesis Diet , blood sugar constrol , stop Marijuana Use.     Activity: As tolerated.    Instructions to Patient: as above     Discharge Medications:      Medication List        START taking these medications      ferrous sulfate 325 (65 Fe) MG tablet  Commonly known as: IRON 325  Take 1 tablet by mouth daily (with breakfast)     metoclopramide 10 MG tablet  Commonly known as: REGLAN  Take 1 tablet by mouth 3 times daily as needed (Gastroparesis)     vitamin C 500 MG tablet  Commonly known as: ASCORBIC ACID  Take 1 tablet by mouth daily            CONTINUE taking these medications      Dexcom G7 Sensor Misc     LORazepam 0.5 MG tablet  Commonly known as: ATIVAN     losartan 50 MG tablet  Commonly known as: COZAAR     metoprolol succinate 50 MG extended release tablet  Commonly known as: TOPROL XL     NovoLOG FlexPen 100 UNIT/ML injection pen  Generic drug: insulin aspart     * PARoxetine 30 MG tablet  Commonly known as: PAXIL     * PARoxetine 20 MG tablet  Commonly known as: PAXIL     Toujeo Max SoloStar 300 UNIT/ML Sopn  Generic drug: Insulin Glargine (2 Unit Dial)           * This list has 2 medication(s) that are the same as other medications prescribed for you. Read the directions carefully, and ask your doctor or other care provider to review them with you.                   Where to Get Your Medications        These medications were sent to [facility] [id number] - [facility] [address] - P [phone] - F [phone] [address] [facility] [address]      Phone: [phone]

## 2024-07-14 NOTE — CARE COORDINATION
DC Planning    Called [facility] they are open until 3 spoke with pharmacist-he cannot voucher the meds prescribed as 2 of them are over the counter and the other is not a med listed on the med voucher program.     Called [facility] Cost of 3 rxs is 39.29 for all 3-rx tech will check if can get price lower with Singlecare discount program.     Called and updated pt by phone-pt frustrated that her meds aren't covered-informed since she has [place] Medicaid-not covered in [place]-and explained med voucher program as 2 of the meds are OTC.. Gave cost of meds from [facility]-she will pay out of pocket.

## 2024-07-14 NOTE — PROGRESS NOTES
[facility]     DAILY PROGRESS NOTE      Patient:   Pa Giraldo   :    1985   Date:     2024  Consultant:   DO KALEB Garcia    Subjective:     39 y.o. female admitted 7/10/2024 with Generalized abdominal pain [R10.84]  Diabetic gastroparesis (HCC) [E11.43, K31.84]  Nausea and vomiting, unspecified vomiting type [R11.2] and seen for nausea and vomiting.  She has a 25-year history of diabetes.  She remains on insulin at home.  She was admitted with hyperglycemia, poorly controlled diabetes.  She has nausea and vomiting.  She is marginally better today.  She still is not tolerating Jell-O.  This is her second episode of significant nausea and vomiting since the birth of her child over the last 2 years.  She recalls being on erythromycin in the past when hospitalized, that she believes was helpful.  She is tolerating metoclopramide well, however still not significantly improved overnight.  She complains of upper abdominal discomfort, perhaps a bit better.  Labs reviewed..    Current Medications include:   Scheduled Meds:   erythromycin  250 mg IntraVENous Q8H    scopolamine  1 patch TransDERmal Q72H    docusate sodium  100 mg Oral BID    lisinopril  20 mg Oral Daily    pantoprazole (PROTONIX) 40 mg in sodium chloride (PF) 0.9 % 10 mL injection  40 mg IntraVENous Q12H    metoclopramide  10 mg IntraVENous Q6H    sodium chloride  100 mL IntraVENous Once    sodium chloride flush  5-40 mL IntraVENous 2 times per day    enoxaparin  40 mg SubCUTAneous Daily    insulin lispro  0-4 Units SubCUTAneous TID WC    insulin lispro  0-4 Units SubCUTAneous Nightly    capsicum   Topical Q6H     Continuous Infusions:   lactated ringers IV soln 125 mL/hr at 24 0611    sodium chloride      dextrose       PRN Meds:.LORazepam, prochlorperazine, fentanNYL, sodium chloride flush, sodium chloride flush, sodium chloride, potassium chloride **OR** potassium alternative oral 
  [x] There are one or more home medications that need clarification before Medication Reconciliation can be completed. The Med List Status has been marked as In Progress.     To assist with Home Medication Reconciliation the following actions have been taken:    [x] Pharmacy medication reconciliation service requested. (Note: This can be done by sending a Perfect Serve message to [facility] Rec Pharmacist or by phoning [phone].)  [] Family requested to bring medications into the hospital  [] Family requested to call hospital with medication list  [] Message left with physician office  [] Request for medical records made to   [] Other        
Aldridge removed per orders. Pt tolerated well.  Waiting for pt to void then ok for discharge per Dr Smith.   
All patient belongings collected. Midline and telemetry removed. Discharge paperwork given and explained to patient, along with a list of PCPs, all questions answered. See discharge event for further details.     
Comprehensive Nutrition Assessment    Type and Reason for Visit:  Positive Nutrition Screen (Unplanned weight loss and decreased appetite)    Nutrition Recommendations/Plan:   ADULT DIET; Full Liquid; 4 carb choices (60 gm/meal); gastroparesis diet - small multiple meals   Ensure Plus High Protein 2x/day  Monitor p.o intakes, GI function, nausea/vomiting status and labs     Malnutrition Assessment:  Malnutrition Status:  Mild malnutrition (07/12/24 6131)    Context:  Acute Illness     Findings of the 6 clinical characteristics of malnutrition:  Energy Intake:  75% or less of estimated energy requirements for 7 or more days  Weight Loss:  Unable to assess     Body Fat Loss:  No significant body fat loss     Muscle Mass Loss:  No significant muscle mass loss    Fluid Accumulation:  No significant fluid accumulation     Strength:  Not Performed    Nutrition Assessment:    Patient admission is related to generalized abdominal pain, nausea and vomiting from diabetic gastroparesis. Patient reports weight loss due to nausea and vomiting from gastroparesis. Patient states a usual body weight of 200 lbs. Weight loss time frame unknown. Patient experienced nausea and dark red colored emesis yesterday (7/11). Nausea and vomiting has improved today. Diet advanced to Full liquids for dinner tonight. Start Ensure High Protein 2x/day. Will monitor p.o intakes and labs.    Nutrition Related Findings:    No edema. Active bowel sounds. Gastroparesis, nausea and abdominal pain. Nausea improved Wound Type: None       Current Nutrition Intake & Therapies:    Average Meal Intake: Unable to assess  Average Supplements Intake: Unable to assess  ADULT DIET; Full Liquid; 4 carb choices (60 gm/meal); gastroparesis diet - small multiple meals    Anthropometric Measures:  Height: 149.9 cm (4' 11\")  Ideal Body Weight (IBW): 95 lbs (43 kg)       Current Body Weight: 85.3 kg (188 lb), 197.9 % IBW.    Current BMI (kg/m2): 38  Usual Body Weight: 
Occupational Therapy  DATE: 2024    NAME: Pa Giraldo  MRN: [id number]   : 1985    Patient not seen this date for Occupational Therapy due to:      [] Cancel by RN or physician due to:    [] Hemodialysis    [] Critical Lab Value Level     [] Blood transfusion in progress    [] Acute or unstable cardiovascular status   _MAP < 55 or more than >115  _HR < 40 or > 130    [] Acute or unstable pulmonary status   -FiO2 > 60%   _RR < 5 or >40    _O2 sats < 85%    [] Strict Bedrest    [] Off Unit for surgery or procedure    [] Off Unit for testing       [] Pending imaging to R/O fracture    [] Refusal by Patient      [] Intubated    [] Other      [x] OT being discontinued at this time. Patient independent. No further needs. (RN reporting pt indep prior and pt is in pain. Pt in room in bed. Pt reporting she does not need therapy and is in pain. Pt encouraged to let staff know if needs arise for therapy to come back and eval. OT to discontinue orders this date.)      [] OT being discontinued at this time as the patient has been transferred to hospice care. No further needs.      Lucille Munguia, JAXON, OTR/L      
Patient retaining 1200mL. Orders for straight cath obtained. Patient used bedside commode and was able to urinate 750mL. Patient immediately bladder scanned, repeat showed 550mL. Straight cath performed, 500mL urine output removed  
Physical Therapy  DATE: 2024    NAME: Pa Giraldo  MRN: [id number]   : 1985    Patient not seen this date for Physical Therapy due to:      [] Cancel by RN or physician due to:    [] Hemodialysis    [] Critical Lab Value Level     [] Blood transfusion in progress    [] Acute or unstable cardiovascular status   _MAP < 55 or more than >115  _HR < 40 or > 130    [] Acute or unstable pulmonary status   -FiO2 > 60%   _RR < 5 or >40    _O2 sats < 85%    [] Strict Bedrest    [] Off Unit for surgery or procedure    [] Off Unit for testing       [] Pending imaging to R/O fracture    [] Refusal by Patient      [x] Other   Patient declined PT eval and stating no therapy needs. Patient request PT eval to be discontinued. RN notified. PT discontinued.     [] PT being discontinued at this time. Patient independent. No further needs.     [] PT being discontinued at this time as the patient has been transferred to hospice care. No further needs.      Connie Burks, PT     
[facility]  Office: [phone]  Gareth Turner DO, Yves Grubbs, DO, Jd Greer DO, Nikita Jim, DO, Shalini Grover MD, Kavitha Browne MD, Batsheva Smith MD, Madhuri Bradford MD,  Alfonso Fletcher MD, Ruth Jones MD, Neha Loo MD,  Jesus Alatorre DO, Isidro Tesfaye MD, Pratik Malik MD, Shakir Turner DO, Cat Quijano MD,  Paul Monteiro DO, Fiorella Aj MD, Kristy Friedman MD, Franny Santillan MD, Hardeep Davenport MD,  Janes Alan MD, Dutch De La Cruz MD, Phil Whitt MD, Britt James MD, Harsha Hernandez MD, Jay Leonard MD, Alejo Valenzuela DO, Ac Oconnell DO, Lubna Angel MD,  Kalyan Carlisle MD, Shirley Waterhouse, CNP,  Alva Bear CNP, Phil Puente, CNP,  Maeve Markham, DNP, Rosalina Cortes, CNP, Amada Kim, CNP, Symone Sarkar, CNP, Lis Gregory, CNP, Anna Eller, PA-C, Shanda Rosales PA-C, Kaya Tucker, CNP, Clara Shaikh, CNP, Jovita Cortés, CNP, Eli Greer, CNP, Luisa Rivers, CNP, Nayana Rankin, CNS, Milagro Gaona, CNP, Beverley Love CNP, Tracy Schwab, CNP       [facility]      Daily Progress Note     Admit Date: 7/10/2024  Bed/Room No.  1007/1007-02  Admitting Physician : Batsheva Smith MD  Code Status :Full Code  Hospital Day:  LOS: 1 day   Chief Complaint:     Chief Complaint   Patient presents with    Abdominal Pain     Vomiting      Principal Problem:    Diabetic gastroparesis (HCC)  Active Problems:    Marijuana abuse    Constipation    Hypertension  Resolved Problems:    * No resolved hospital problems. *    Subjective :        Interval History/Significant events :  07/11/24    Patient continues to complain of abdominal pain associate with nausea.  Patient had multiple episodes of clear done bloody, nonbilious emesis.  Patient is afebrile.  She denies any dysuria, frequency, hematuria.  Patient has known history of gastroparesis.  She reported that she came to the city from [place] few days ago.   Vitals - 
Pt admitted to room 1007 from ED. Admission documentation complete, vitals taken and telemetry placed. Pt oriented to room and unit routine, including hourly rounding. Call light in reach and safety maintained. Will continue to provide support and education.    
Transitions of Care Pharmacy Service   Medication Review    The patient's list of current home medications has been reviewed.     Source(s) of information: spoke to patient and [facility] pharmacy     Based on information provided by the above source(s), I have updated the patient's home med list as described below.       I changed or updated the following medications on the patient's home medication list:  Discontinued Albuterol 108 mcg inhaler   Ferrous Sulfate 325 mg   Insulin glargine(Lantus solostar) 100units/ml inject pen     Added PARoxetine (PAXIL) 20 MG tablet  PARoxetine (PAXIL) 30 MG tablet  insulin aspart (NOVOLOG FLEXPEN) 100 UNIT/ML injection pen  Insulin Glargine, 2 Unit Dial, (TOUJEO MAX SOLOSTAR) 300 UNIT/ML SOPN  metoprolol succinate (TOPROL XL) 50 MG extended release tablet  losartan (COZAAR) 50 MG tablet  LORazepam (ATIVAN) 0.5 MG tablet     Adjusted   None      Other Notes None            Please feel free to call me with any questions about this encounter. Thank you.    This note will be reviewed and co-signed by the Transitions of Care Pharmacist. The pharmacist will review inpatient orders and contact the physician about any discrepancies.    Jose Maria Munguia, pharmacy technician  [facility]itions [facility] Service  Phone:  [phone]  Fax: [phone]      Electronically signed by Jose Maria Munguia on 7/12/2024 at 10:37 AM       Prior to Admission medications    Medication Sig   PARoxetine (PAXIL) 20 MG tablet Take 1 tablet by mouth every morning with PARoxetine 30mg tab (50mg) total daily   insulin aspart (NOVOLOG FLEXPEN) 100 UNIT/ML injection pen Inject 40 Units into the skin 3 times daily (before meals)   LORazepam (ATIVAN) 0.5 MG tablet Take 1 tablet by mouth 2 times daily as needed (agitation). Max Daily Amount: 1 mg   losartan (COZAAR) 50 MG tablet Take 1 tablet by mouth daily   metoprolol succinate (TOPROL XL) 50 MG extended release tablet Take 1 tablet by mouth daily   PARoxetine 
Writer called PCCU attempt to give report to Bill ASKEW. Unavailable to reach RN at this time. PCCU charge will inform Bill to call ED.   
Writer gave report to Bill ASKEW. All questions answered. Will transfer to Room 1007.  
Writer updated pt Aunt Tiffani pt has been admitted to Room 1007.   
CALCIUM 8.5 07/14/2024 08:16 AM    BILITOT 0.3 07/10/2024 04:15 PM    ALKPHOS 123 07/10/2024 04:15 PM    AST 23 07/10/2024 04:15 PM    ALT 15 07/10/2024 04:15 PM     BMP:    Lab Results   Component Value Date/Time     07/14/2024 08:16 AM    K 3.1 07/14/2024 08:16 AM     07/14/2024 08:16 AM    CO2 24 07/14/2024 08:16 AM    BUN 9 07/14/2024 08:16 AM    CREATININE 0.7 07/14/2024 08:16 AM    CALCIUM 8.5 07/14/2024 08:16 AM    GFRAA >60 09/06/2014 12:56 PM    LABGLOM >90 07/14/2024 08:16 AM    GLUCOSE 89 07/14/2024 08:16 AM    GLUCOSE 259 01/31/2012 05:34 PM     BUN/Creatinine:    Lab Results   Component Value Date/Time    BUN 9 07/14/2024 08:16 AM    CREATININE 0.7 07/14/2024 08:16 AM     Hepatic Function Panel:    Lab Results   Component Value Date/Time    ALKPHOS 123 07/10/2024 04:15 PM    ALT 15 07/10/2024 04:15 PM    AST 23 07/10/2024 04:15 PM    BILITOT 0.3 07/10/2024 04:15 PM    BILIDIR 0.1 07/10/2024 04:15 PM    IBILI 0.2 07/10/2024 04:15 PM     PT/INR:  No results found for: \"PROTIME\", \"INR\"      Assessment:   38 yo female with presumed diabetic gastroparesis flare, clinically improving on IV Reglan and IV erythromycin.   Tolerating full liquids.     Plan:   Advance to gastroparesis, diabetic diet. Discussed with patient.   No objection to discharge from a GI standpoint.   Recommend to discharge on lowest effect Reglan dose.   Will will follow her in the office.     This plan was formulated in collaboration with Dr. Duarte GASTELUM, APN  9:50 AM  7/14/2024  
  Outpatient colonoscopy to complete anemia w/u. Would be nice to obtain previous GI records from [place] if able.   Following.       This plan was formulated in collaboration with ROSA ISELA Corrales  1:14 PM  7/12/2024    Seen and examined.  Agree with the comments and plan as noted above    Landry Ramachandran DO [facility]  [address] [place] [address]  [phone]    
Kidney function, oxygenation  as indicated .   Plan and updates discussed with patient ,  answers  explained to satisfaction.   Plan discussed with Staff Lacie ASKEW     (This note is created with the assistance of a speech recognition program. While intending to generate a document that actually reflects the content of the visit, the document can still have some errors including those of syntax and sound a like substitutions which may escape proof reading. In such instances, actual meaning can be extrapolated by contextual diversion.)      Batsheva Smith MD  7/12/2024    
narcotics, promethazine. Zofran / Reglan / scopolamine for nausea.   GI following. IV erythromycin was added. Obtain records from primary GI from [place]..  Will strongly recommend limiting marijuana abuse,  diet  as tolerated..  Type 1 diabetes mellitus -last A1c 8.3.  Fecal impaction/constipation -bowel regimen. Suggest suppository   Urinary retention - insert Aldridge , treat constipation, consult Urology .  Morbid obesity BMI 42 -will benefit with lifestyle changes.    Resume Oral home meds   Continue to monitor vitals , Intake / output ,  Cell count , HGB , Kidney function, oxygenation  as indicated .   Plan and updates discussed with patient ,  answers  explained to satisfaction.   Plan discussed with Staff Zoey ASKEW     (This note is created with the assistance of a speech recognition program. While intending to generate a document that actually reflects the content of the visit, the document can still have some errors including those of syntax and sound a like substitutions which may escape proof reading. In such instances, actual meaning can be extrapolated by contextual diversion.)      Batsheva Smith MD  7/13/2024    
with IV fluids, adjust insulin for better blood sugar control, limit narcotics.  .  Will strongly recommend limiting marijuana abuse, tolerating diet..  Patient requesting refill on Reglan..  Type 1 diabetes mellitus -last A1c 7.2.  Reports she has medications for diabetes at home.  Fecal impaction/constipation -bowel regimen.  Morbid obesity BMI 42 -will benefit with lifestyle changes.  Urinary retention secondary to constipation, stool impaction, treated with urinary catheterization.  Remove catheter.  Post Void residue.      Continue to monitor vitals , Intake / output ,  Cell count , HGB , Kidney function, oxygenation  as indicated .   Plan and updates discussed with patient ,  answers  explained to satisfaction.   Plan discussed with Staff Megan ASKEW     (This note is created with the assistance of a speech recognition program. While intending to generate a document that actually reflects the content of the visit, the document can still have some errors including those of syntax and sound a like substitutions which may escape proof reading. In such instances, actual meaning can be extrapolated by contextual diversion.)      Batsheva Smith MD  7/14/2024